# Patient Record
Sex: FEMALE | Race: WHITE | Employment: FULL TIME | URBAN - METROPOLITAN AREA
[De-identification: names, ages, dates, MRNs, and addresses within clinical notes are randomized per-mention and may not be internally consistent; named-entity substitution may affect disease eponyms.]

---

## 2017-02-24 ENCOUNTER — OFFICE VISIT (OUTPATIENT)
Dept: FAMILY MEDICINE CLINIC | Age: 57
End: 2017-02-24

## 2017-02-24 VITALS
DIASTOLIC BLOOD PRESSURE: 72 MMHG | RESPIRATION RATE: 20 BRPM | HEIGHT: 65 IN | BODY MASS INDEX: 22.82 KG/M2 | TEMPERATURE: 97.8 F | WEIGHT: 137 LBS | SYSTOLIC BLOOD PRESSURE: 118 MMHG | HEART RATE: 90 BPM

## 2017-02-24 DIAGNOSIS — Z12.11 SCREEN FOR COLON CANCER: ICD-10-CM

## 2017-02-24 DIAGNOSIS — F33.42 RECURRENT MAJOR DEPRESSIVE DISORDER, IN FULL REMISSION (HCC): ICD-10-CM

## 2017-02-24 DIAGNOSIS — F51.04 PSYCHOPHYSIOLOGICAL INSOMNIA: ICD-10-CM

## 2017-02-24 DIAGNOSIS — M79.7 FIBROMYALGIA: Primary | ICD-10-CM

## 2017-02-24 PROCEDURE — 99214 OFFICE O/P EST MOD 30 MIN: CPT | Performed by: FAMILY MEDICINE

## 2017-02-24 RX ORDER — NORTRIPTYLINE HYDROCHLORIDE 50 MG/1
50 CAPSULE ORAL NIGHTLY
Qty: 90 CAPSULE | Refills: 1 | Status: SHIPPED | OUTPATIENT
Start: 2017-02-24 | End: 2017-10-03 | Stop reason: SDUPTHER

## 2017-03-03 DIAGNOSIS — Z12.39 BREAST CANCER SCREENING: Primary | ICD-10-CM

## 2017-03-14 ENCOUNTER — OFFICE VISIT (OUTPATIENT)
Dept: INTERNAL MEDICINE CLINIC | Age: 57
End: 2017-03-14

## 2017-03-14 VITALS
HEIGHT: 65 IN | RESPIRATION RATE: 16 BRPM | DIASTOLIC BLOOD PRESSURE: 60 MMHG | OXYGEN SATURATION: 99 % | TEMPERATURE: 98 F | HEART RATE: 95 BPM | BODY MASS INDEX: 22.49 KG/M2 | SYSTOLIC BLOOD PRESSURE: 99 MMHG | WEIGHT: 135 LBS

## 2017-03-14 DIAGNOSIS — M94.0 COSTOCHONDRITIS, ACUTE: ICD-10-CM

## 2017-03-14 DIAGNOSIS — R05.9 COUGH: Primary | ICD-10-CM

## 2017-03-14 PROCEDURE — 99214 OFFICE O/P EST MOD 30 MIN: CPT | Performed by: NURSE PRACTITIONER

## 2017-03-14 RX ORDER — BENZONATATE 100 MG/1
100 CAPSULE ORAL 2 TIMES DAILY PRN
Qty: 20 CAPSULE | Refills: 0 | Status: SHIPPED | OUTPATIENT
Start: 2017-03-14 | End: 2017-03-21

## 2017-03-14 ASSESSMENT — ENCOUNTER SYMPTOMS
SHORTNESS OF BREATH: 0
COUGH: 1

## 2017-03-22 RX ORDER — NORTRIPTYLINE HYDROCHLORIDE 10 MG/1
CAPSULE ORAL
Qty: 60 CAPSULE | Refills: 2 | Status: SHIPPED | OUTPATIENT
Start: 2017-03-22 | End: 2017-10-03 | Stop reason: ALTCHOICE

## 2017-08-07 ENCOUNTER — OFFICE VISIT (OUTPATIENT)
Dept: FAMILY MEDICINE CLINIC | Age: 57
End: 2017-08-07

## 2017-08-07 VITALS
HEIGHT: 65 IN | DIASTOLIC BLOOD PRESSURE: 72 MMHG | WEIGHT: 129 LBS | BODY MASS INDEX: 21.49 KG/M2 | HEART RATE: 62 BPM | TEMPERATURE: 98.3 F | RESPIRATION RATE: 18 BRPM | SYSTOLIC BLOOD PRESSURE: 117 MMHG

## 2017-08-07 DIAGNOSIS — F17.200 TOBACCO DEPENDENCE: ICD-10-CM

## 2017-08-07 DIAGNOSIS — M77.12 LATERAL EPICONDYLITIS OF BOTH ELBOWS: Primary | ICD-10-CM

## 2017-08-07 DIAGNOSIS — M77.02 MEDIAL EPICONDYLITIS OF ELBOW, LEFT: ICD-10-CM

## 2017-08-07 DIAGNOSIS — Z12.11 SCREEN FOR COLON CANCER: ICD-10-CM

## 2017-08-07 DIAGNOSIS — M77.11 LATERAL EPICONDYLITIS OF BOTH ELBOWS: Primary | ICD-10-CM

## 2017-08-07 PROCEDURE — 99214 OFFICE O/P EST MOD 30 MIN: CPT | Performed by: FAMILY MEDICINE

## 2017-08-07 ASSESSMENT — PATIENT HEALTH QUESTIONNAIRE - PHQ9
1. LITTLE INTEREST OR PLEASURE IN DOING THINGS: 0
SUM OF ALL RESPONSES TO PHQ QUESTIONS 1-9: 0
SUM OF ALL RESPONSES TO PHQ9 QUESTIONS 1 & 2: 0
2. FEELING DOWN, DEPRESSED OR HOPELESS: 0

## 2017-08-16 ENCOUNTER — HOSPITAL ENCOUNTER (OUTPATIENT)
Dept: OTHER | Age: 57
Discharge: HOME | End: 2017-08-31
Attending: FAMILY MEDICINE | Admitting: FAMILY MEDICINE

## 2017-08-22 ENCOUNTER — HOSPITAL ENCOUNTER (OUTPATIENT)
Dept: PHYSICAL THERAPY | Age: 57
Discharge: HOME | End: 2017-08-23
Admitting: FAMILY MEDICINE

## 2017-08-24 ENCOUNTER — HOSPITAL ENCOUNTER (OUTPATIENT)
Dept: PHYSICAL THERAPY | Age: 57
Discharge: HOME | End: 2017-08-25
Admitting: FAMILY MEDICINE

## 2017-08-29 ENCOUNTER — HOSPITAL ENCOUNTER (OUTPATIENT)
Dept: PHYSICAL THERAPY | Age: 57
Discharge: HOME | End: 2017-08-30
Admitting: FAMILY MEDICINE

## 2017-08-31 DIAGNOSIS — M79.602 BILATERAL ARM PAIN: ICD-10-CM

## 2017-08-31 DIAGNOSIS — M79.601 BILATERAL ARM PAIN: ICD-10-CM

## 2017-08-31 DIAGNOSIS — R29.898 HAND WEAKNESS: Primary | ICD-10-CM

## 2017-09-05 ENCOUNTER — PROCEDURE VISIT (OUTPATIENT)
Dept: NEUROLOGY | Age: 57
End: 2017-09-05

## 2017-09-05 DIAGNOSIS — M79.602 BILATERAL ARM PAIN: Primary | ICD-10-CM

## 2017-09-05 DIAGNOSIS — M79.601 BILATERAL ARM PAIN: Primary | ICD-10-CM

## 2017-09-05 DIAGNOSIS — G56.22 ULNAR NEUROPATHY OF LEFT UPPER EXTREMITY: ICD-10-CM

## 2017-09-05 PROCEDURE — 95886 MUSC TEST DONE W/N TEST COMP: CPT | Performed by: PSYCHIATRY & NEUROLOGY

## 2017-09-05 PROCEDURE — 95911 NRV CNDJ TEST 9-10 STUDIES: CPT | Performed by: PSYCHIATRY & NEUROLOGY

## 2017-09-07 ENCOUNTER — TELEPHONE (OUTPATIENT)
Dept: FAMILY MEDICINE CLINIC | Age: 57
End: 2017-09-07

## 2017-09-07 DIAGNOSIS — M77.11 LATERAL EPICONDYLITIS OF BOTH ELBOWS: ICD-10-CM

## 2017-09-07 DIAGNOSIS — M77.02 MEDIAL EPICONDYLITIS OF ELBOW, LEFT: ICD-10-CM

## 2017-09-07 DIAGNOSIS — G56.20 ULNAR NEUROPATHY AT ELBOW, UNSPECIFIED LATERALITY: Primary | ICD-10-CM

## 2017-09-07 DIAGNOSIS — M77.12 LATERAL EPICONDYLITIS OF BOTH ELBOWS: ICD-10-CM

## 2017-09-07 PROBLEM — M77.00 MEDIAL EPICONDYLITIS OF ELBOW: Status: ACTIVE | Noted: 2017-09-07

## 2017-09-29 ENCOUNTER — OFFICE VISIT (OUTPATIENT)
Dept: ORTHOPEDIC SURGERY | Age: 57
End: 2017-09-29

## 2017-09-29 VITALS
BODY MASS INDEX: 22.16 KG/M2 | DIASTOLIC BLOOD PRESSURE: 79 MMHG | WEIGHT: 133 LBS | HEART RATE: 85 BPM | RESPIRATION RATE: 16 BRPM | SYSTOLIC BLOOD PRESSURE: 130 MMHG | HEIGHT: 65 IN

## 2017-09-29 DIAGNOSIS — G56.23 CUBITAL TUNNEL SYNDROME OF BOTH UPPER EXTREMITIES: Primary | ICD-10-CM

## 2017-09-29 PROCEDURE — 99243 OFF/OP CNSLTJ NEW/EST LOW 30: CPT | Performed by: ORTHOPAEDIC SURGERY

## 2017-10-03 ENCOUNTER — OFFICE VISIT (OUTPATIENT)
Dept: FAMILY MEDICINE CLINIC | Age: 57
End: 2017-10-03

## 2017-10-03 VITALS
OXYGEN SATURATION: 97 % | HEIGHT: 66 IN | SYSTOLIC BLOOD PRESSURE: 114 MMHG | BODY MASS INDEX: 21.21 KG/M2 | HEART RATE: 95 BPM | TEMPERATURE: 98.2 F | WEIGHT: 132 LBS | DIASTOLIC BLOOD PRESSURE: 64 MMHG

## 2017-10-03 DIAGNOSIS — Z01.818 PREOP EXAMINATION: Primary | ICD-10-CM

## 2017-10-03 DIAGNOSIS — G56.23 CUBITAL TUNNEL SYNDROME OF BOTH UPPER EXTREMITIES: ICD-10-CM

## 2017-10-03 PROCEDURE — 93000 ELECTROCARDIOGRAM COMPLETE: CPT | Performed by: NURSE PRACTITIONER

## 2017-10-03 PROCEDURE — 99243 OFF/OP CNSLTJ NEW/EST LOW 30: CPT | Performed by: NURSE PRACTITIONER

## 2017-10-03 RX ORDER — GABAPENTIN 400 MG/1
CAPSULE ORAL
Qty: 90 CAPSULE | Refills: 2 | Status: SHIPPED | OUTPATIENT
Start: 2017-10-03 | End: 2018-05-08 | Stop reason: SDUPTHER

## 2017-10-03 RX ORDER — NORTRIPTYLINE HYDROCHLORIDE 50 MG/1
50 CAPSULE ORAL NIGHTLY
Qty: 90 CAPSULE | Refills: 0 | Status: SHIPPED | OUTPATIENT
Start: 2017-10-03 | End: 2018-01-04 | Stop reason: SDUPTHER

## 2017-10-03 NOTE — MR AVS SNAPSHOT
Screen for colon cancer    Insomnia    Major depression ([facility] [address]    Vitamin D deficiency- 14 on 3/14/14    Hot flashes      Preventive Care        Date Due    Hepatitis C screening is recommended for all adults regardless of risk factors born between [facility] at least once (lifetime) who have never been tested. 1960    HIV screening is recommended for all people regardless of risk factors  aged 15-65 years at least once (lifetime) who have never been HIV tested. 12/28/1977    Tetanus Combination Vaccine (1 - Tdap) 12/28/1981    Pneumococcal Vaccine - Pneumovax for adults aged 19-64 years with: chronic heart disease, chronic lung disease, diabetes mellitus, alcoholism, chronic liver disease, or cigarette smoking. (1 of 1 - PPSV23) 12/28/1981    Cholesterol Screening 12/28/2002    Colonoscopy 12/28/2012    Mammograms are recommended every 2 years for low/average risk patients aged 48 - 69, and every year for high risk patients per updated national guidelines. However these guidelines can be individualized by your provider. 3/14/2016    Yearly Flu Vaccine (1) 9/1/2017            FREECULTR Signup           Our records indicate that you have an active FREECULTR account. You can view your After Visit Summary by going to [email or web address] and logging in with your FREECULTR username and password. If you don't have a FREECULTR username and password but a parent or guardian has access to your record, the parent or guardian should login with their own FREECULTR username and password and access your record to view the After Visit Summary. Additional Information  If you have questions, please contact the physician practice where you receive care. Remember, FREECULTR is NOT to be used for urgent needs. For medical emergencies, dial 911. For questions regarding your FREECULTR account call [phone]. If you have a clinical question, please call your doctor's office.

## 2017-10-03 NOTE — PROGRESS NOTES
Preoperative Consultation      Heena Verdugo  YOB: 1960    Date of Service:  10/3/2017    Vitals:    10/03/17 1122   BP: 114/64   Site: Right Arm   Position: Sitting   Cuff Size: Medium Adult   Pulse: 95   Temp: 98.2 °F (36.8 °C)   TempSrc: Oral   SpO2: 97%   Weight: 132 lb (59.9 kg)   Height: 5' 6\" (1.676 m)      Wt Readings from Last 2 Encounters:   10/03/17 132 lb (59.9 kg)   09/29/17 133 lb (60.3 kg)     BP Readings from Last 3 Encounters:   10/03/17 114/64   09/29/17 130/79   08/07/17 117/72        Chief Complaint   Patient presents with   Jonathan magallanes/ [facility] will be performing surgery due to pinch nerve on 10/12 and 11/2     Medication Refill     Gabapentin and Nortripyline     No Known Allergies  Outpatient Prescriptions Marked as Taking for the 10/3/17 encounter (Office Visit) with Vladislav Goodwin CNP   Medication Sig Dispense Refill    nortriptyline (PAMELOR) 50 MG capsule Take 1 capsule by mouth nightly 90 capsule 1    gabapentin (NEURONTIN) 400 MG capsule 1 cap q am and 2 caps nightly. 90 capsule 5       This patient presents to the office today for a preoperative consultation at the request of surgeon, Dr. Lola Alfaro, who plans on performing left ulnar nerve decompression at elbow on 10/12/17 and right ulnar nerve decompression at elbow on 11/2/17 at [facility].  The current problem began 6 months ago, and symptoms have been worsening with time. Patient reports that she is having shooting pains. She reports that she is having decreased coordination. Left forearm is more numbness. Right forearm is more painful. Patient had EMG that was completed on 9/5/17.      Planned anesthesia: General   Known anesthesia problems: None   Bleeding risk: No recent or remote history of abnormal bleeding  Personal or FH of DVT/PE: No    Patient objection to receiving blood products: No    Patient Active Problem List   Diagnosis    Hot flashes    Vitamin D deficiency- 15 on 3/14/14    Fibromyalgia    Screen for colon cancer    Insomnia    Major depression ([facility] [address]    Screening for breast cancer    Tobacco dependence    Medial epicondylitis of elbow    Ulnar neuropathy at elbow    Lateral epicondylitis of both elbows       Past Medical History:   Diagnosis Date    Abdominal pain, chronic, generalized     managed with OTC 'digestive enzymes'    Interstitial cystitis 2004    Dr Humera Simental    Vitamin D deficiency 3/19/2014     Past Surgical History:   Procedure Laterality Date    BREAST ENHANCEMENT SURGERY  2008    BREAST LUMPECTOMY  1980's    left breast; benign cyst    CARPAL TUNNEL RELEASE      bilateral; in 1980's    HYSTERECTOMY      for fibroids; ovaries remain   Donel Preethi Simental; for incontinence     Family History   Problem Relation Age of Onset    Seizures Mother     Kidney Disease Father      due to BPH     Social History     Social History    Marital status:      Spouse name: Michela Guzmán Number of children: 3    Years of education: N/A     Occupational History          Social History Main Topics    Smoking status: Current Every Day Smoker     Packs/day: 1.00     Years: 30.00     Types: Cigarettes    Smokeless tobacco: Never Used      Comment: encouraged to consider quitting. not ready yet, but starting to use vapor nicotine to decrease use (8/16)    Alcohol use 0.0 oz/week     0 Standard drinks or equivalent per week      Comment: rare use    Drug use: No    Sexual activity: Yes     Partners: Male      Comment:  Mia Rice     Other Topics Concern    Not on file     Social History Narrative       Review of Systems  All other systems reviewed and were negative except for what was noted in the HPI. Physical Exam   Constitutional: She is oriented to person, place, and time. She appears well-developed and well-nourished. No distress. HENT:   Head: Normocephalic and atraumatic.    Mouth/Throat: Uvula is midline, oropharynx is clear and moist and mucous membranes are normal.   Eyes: Conjunctivae and EOM are normal. Pupils are equal, round, and reactive to light. Neck: Trachea normal and normal range of motion. Neck supple. No JVD present. Carotid bruit is not present. Cardiovascular: Normal rate, regular rhythm, normal heart sounds and intact distal pulses. Exam reveals no gallop and no friction rub. No murmur heard. Pulmonary/Chest: Effort normal and breath sounds normal. No respiratory distress. She has no wheezes. She has no rales. Musculoskeletal: She exhibits no edema. Full alexis elbow ROM. Positive for medial forearm tenderness to palpation. Neurological: She is alert and oriented to person, place, and time. She has normal strength. No cranial nerve deficit. Coordination and gait normal.   Skin: Skin is warm and dry. No rash noted. No erythema. Psychiatric: She has a normal mood and affect. Her behavior is normal.     EKG Interpretation:  normal EKG, normal sinus rhythm, there are no previous tracings available for comparison. Lab Review   Lab Results   Component Value Date     03/14/2014    K 4.1 03/14/2014     03/14/2014    CO2 27 03/14/2014    BUN 13 03/14/2014    CREATININE 0.7 03/14/2014    GLUCOSE 88 03/14/2014    CALCIUM 9.6 03/14/2014     Lab Results   Component Value Date    WBC 4.9 10/30/2012    HGB 13.6 10/30/2012    HCT 40.1 10/30/2012    MCV 92.8 10/30/2012     10/30/2012           Assessment:       49 y.o. patient with planned surgery as above. Known risk factors for perioperative complications: Tobacco abuse  Current medications which may produce withdrawal symptoms if withheld perioperatively: none      Plan:     1. Preoperative workup as follows: ECG  2. Change in medication regimen before surgery: Hold all medications on morning of surgery  3.  Prophylaxis for cardiac events with perioperative beta-blockers: Not indicated  ACC/AHA indications for pre-operative beta-blocker use:    · Vascular surgery with history of postitive stress test  · Intermediate or high risk surgery with history of CAD   · Intermediate or high risk surgery with multiple clinical predictors of CAD- 2 of the following: history of compensated or prior heart failure, history of cerebrovascular disease, DM, or renal insufficiency    Routine administration of higher-dose, long-acting metoprolol in beta-blockernaïve patients on the day of surgery, and in the absence of dose titration is associated with an overall increase in mortality. Beta-blockers should be started days to weeks prior to surgery and titrated to pulse < 70.  4. Deep vein thrombosis prophylaxis: regimen to be chosen by surgical team  5. No contraindications to planned surgery of left ulnar nerve decompression at elbow on 10/12/17 and right ulnar nerve decompression at elbow on 11/2/17 with Dr. Sabrina Marks   6. Tobacco use- Smoking cessation discussed and needed. Patient declined at this time. Patient declines flu vaccine.

## 2017-10-06 NOTE — TELEPHONE ENCOUNTER
Called and spoke to Dr. Madhav Flowers' [address] and she reports that patient can stay on medication. Discussed that I do not see a reason for her to stop prior to date of surgery. Please call patient and let her know that she can continue her medication.

## 2017-10-09 ENCOUNTER — PAT TELEPHONE (OUTPATIENT)
Dept: PREADMISSION TESTING | Age: 57
End: 2017-10-09

## 2017-10-09 VITALS — WEIGHT: 132 LBS | HEIGHT: 65 IN | BODY MASS INDEX: 21.99 KG/M2

## 2017-10-09 NOTE — PRE-PROCEDURE INSTRUCTIONS
C-Difficile admission screening and protocol:     * Admitted with diarrhea?no     *Prior history of C-Diff. In last 3 months?yes-over1 year ago-     *Antibiotic use in the past 6-8 weeks?no     *Prior hospitalization or nursing home in the last month?     no

## 2017-10-09 NOTE — PRE-PROCEDURE INSTRUCTIONS
[address] [facility] time__0915__________        Surgery time____1035________    Take the following medications with a sip of water: no meds    Do not eat or drink anything after 12:00 midnight prior to your surgery. This includes water chewing gum, mints and ice chips. You may brush your teeth and gargle the morning of your surgery, but do not swallow the water     Please see your family doctor/pediatrician for a history and physical and/or concerning medications. Bring any test results/reports from your physicians office. If you are under the care of a heart doctor or specialist doctor, please be aware that you may be asked to them for clearance    You may be asked to stop blood thinners such as Coumadin, Plavix, Fragmin, Lovenox, etc., or any anti-inflammatories such as:  Aspirin, Ibuprofen, Advil, Naproxen prior to your surgery. We also ask that you stop any OTC medications such as fish oil, vitamin E, glucosamine, garlic, Multivitamins, COQ 10, etc.may take tylenol    We ask that you do not smoke 24 hours prior to surgery  We ask that you do not  drink any alcoholic beverages 24 hours prior to surgery     You must make arrangements for a responsible adult to take you home after your surgery. For your safety you will not be allowed to leave alone or drive yourself home. Your surgery will be cancelled if you do not have a ride home. Also for your safety, it is strongly suggested that someone stay with you the first 24 hours after your surgery. A parent or legal guardian must accompany a child scheduled for surgery and plan to stay at the hospital until the child is discharged. Please do not bring other children with you. For your comfort, please wear simple loose fitting clothing to the hospital.  Please do not bring valuables.     Do not wear any make-up or nail polish on your fingers or toes      For your safety, please do not wear any jewelry or body piercing's on the day of surgery. All jewelry must be removed. If you have dentures, they will be removed before going to operating room. For your convenience, we will provide you with a container. If you wear contact lenses or glasses, they will be removed, please bring a case for them. If you have a living will and a durable power of  for healthcare, please bring in a copy. As part of our patient safety program to minimize surgical site infections, we ask you to do the following:    · Please notify your surgeon if you develop any illness between         now and the  day of your surgery. · This includes a cough, cold, fever, sore throat, nausea,         or vomiting, and diarrhea, etc.  ·  Please notify your surgeon if you experience dizziness, shortness         of breath or blurred vision between now and the time of your surgery. Do not shave your operative site 96 hours prior to surgery. For face and neck surgery, men may use an electric razor 48 hours   prior to surgery. You may shower the night before surgery or the morning of   your surgery with an antibacterial soap. You will need to bring a photo ID and insurance card    [facility] has an onsite pharmacy, would you like to utilize our pharmacy     If you will be staying overnight and use a C-pap machine, please bring   your C-pap to hospital     Our goal is to provide you with excellent care, therefore, visitors will be limited to two(2) in the room at a time so that we may focus on providing this care for you. Please contact pre-admission testing if you have any further questions. [facility] phone number:  [phone] [facility] fax number:  [phone]  Please note these are generalized instructions for all surgical cases, you may be provided with more specific instructions according to your surgery.

## 2017-10-12 ENCOUNTER — TELEPHONE (OUTPATIENT)
Dept: OTHER | Facility: CLINIC | Age: 57
End: 2017-10-12

## 2017-10-12 ENCOUNTER — TELEPHONE (OUTPATIENT)
Dept: ORTHOPEDIC SURGERY | Age: 57
End: 2017-10-12

## 2017-10-12 ENCOUNTER — HOSPITAL ENCOUNTER (OUTPATIENT)
Dept: SURGERY | Age: 57
Discharge: HOME | End: 2017-10-12
Attending: ORTHOPAEDIC SURGERY | Admitting: ORTHOPAEDIC SURGERY

## 2017-10-12 VITALS
WEIGHT: 131.17 LBS | SYSTOLIC BLOOD PRESSURE: 134 MMHG | DIASTOLIC BLOOD PRESSURE: 71 MMHG | TEMPERATURE: 97.8 F | HEART RATE: 74 BPM | BODY MASS INDEX: 21.85 KG/M2 | HEIGHT: 65 IN | OXYGEN SATURATION: 99 % | RESPIRATION RATE: 18 BRPM

## 2017-10-12 RX ORDER — HYDROCODONE BITARTRATE AND ACETAMINOPHEN 5; 325 MG/1; MG/1
1 TABLET ORAL EVERY 6 HOURS PRN
Status: DISCONTINUED | OUTPATIENT
Start: 2017-10-12 | End: 2017-10-13 | Stop reason: HOSPADM

## 2017-10-12 RX ORDER — SODIUM CHLORIDE 9 MG/ML
INJECTION, SOLUTION INTRAVENOUS CONTINUOUS
Status: DISCONTINUED | OUTPATIENT
Start: 2017-10-12 | End: 2017-10-13 | Stop reason: HOSPADM

## 2017-10-12 RX ORDER — LABETALOL HYDROCHLORIDE 5 MG/ML
5 INJECTION, SOLUTION INTRAVENOUS EVERY 10 MIN PRN
Status: DISCONTINUED | OUTPATIENT
Start: 2017-10-12 | End: 2017-10-13 | Stop reason: HOSPADM

## 2017-10-12 RX ORDER — PROMETHAZINE HYDROCHLORIDE 25 MG/ML
6.25 INJECTION, SOLUTION INTRAMUSCULAR; INTRAVENOUS
Status: ACTIVE | OUTPATIENT
Start: 2017-10-12 | End: 2017-10-12

## 2017-10-12 RX ORDER — FENTANYL CITRATE 50 UG/ML
25 INJECTION, SOLUTION INTRAMUSCULAR; INTRAVENOUS EVERY 5 MIN PRN
Status: DISCONTINUED | OUTPATIENT
Start: 2017-10-12 | End: 2017-10-13 | Stop reason: HOSPADM

## 2017-10-12 RX ADMIN — SODIUM CHLORIDE: 9 INJECTION, SOLUTION INTRAVENOUS at 10:30

## 2017-10-12 RX ADMIN — HYDROCODONE BITARTRATE AND ACETAMINOPHEN 1 TABLET: 5; 325 TABLET ORAL at 12:07

## 2017-10-12 ASSESSMENT — PAIN SCALES - GENERAL
PAINLEVEL_OUTOF10: 1
PAINLEVEL_OUTOF10: 0

## 2017-10-12 ASSESSMENT — PAIN - FUNCTIONAL ASSESSMENT: PAIN_FUNCTIONAL_ASSESSMENT: 0-10

## 2017-10-12 ASSESSMENT — PAIN DESCRIPTION - DESCRIPTORS: DESCRIPTORS: ACHING;DISCOMFORT;SORE

## 2017-10-12 NOTE — OP NOTE
OPERATIVE REPORT              . Patient:  Annalise Roberts    YOB: 1960  Date of Service:  10/12/2017   Location:  [facility]      Preoperative Diagnosis:   Left Ulnar Nerve entrapment at the Cubital Tunnel    Postoperative Diagnosis:  Same    Procedure:   Left Ulnar Nerve decompression & Cubital Tunnel Release    Surgeon:  Wayland Oppenheim. Gabriel Sotomayor MD    Anesthesia:  General          Blood Loss:  Minimal    Complications:  None       Tourniquet Time: 4 minutes. Indications:  Ms. Annalise Roberts  is a 49y.o. year old female with entrapment of her Left ulnar nerve at the elbow. I have discussed preoperatively with Ms. Annalise Roberts  the complications, limitations, expectations, alternatives and risks of surgical care, which she understood. Ms. Annalise Roberts  has provided written informed consent to proceed. After written consent was obtained and the proper operative site identified and marked, Ms. Annalise Roberts was brought to the operating room and placed in the supine position on the operating room table. The Left arm was extended on a hand table & the Left upper extremity was prepped and draped in the usual sterile fashion. After Esmarch exsanguination the pneumo-tourniquet was inflated about the upper arm to 250 millimeters of mercury. A curvilinear incision was fashioned over the posterior medial aspect of the elbow centered between the medial epicondyle and the tip of the olecranon. Dissection was carried carefully through the subcutaneous tissue identifying and protecting the superficial neurovascular structures. The cubital tunnel was identified and cleared of overlying soft tissue. Careful incision allowed exposure of the ulnar nerve. The nerve was traced proximally and circumferential control of the nerve was obtained.  It was dissected proximally to the level of the connection between the biceps and triceps muscles, approximately 3 cm proximal to the medial epicondyle. It was carefully mobilized from the medial intermuscular septum. It was traced distally, being completely freed along the course of the cubital tunnel, and was dissected distally to the level of the second motor branch as it split the heads of the FCU muscle. There was a tight fibrous band at the confluence of the heads of the FCU which was carefully divided. Digital palpation revealed that there were no further proximal or distal constriction about the nerve. The Ulnar Nerve was found to be stable in it's groove without tendency toward subluxation or snapping. The wound at this point was irrigated copiously with sterile saline for irrigation and the pneumo-tourniquet deflated after a period of 4  minutes of elevation. The fingers were immediately pink and well perfused. Hemostasis was easily obtained with direct pressure and bipolar cautery. The subcutaneous tissue was closed with interrupted absorbable sutures. The skin was closed with interrupted absorbable sutures and local ansethetic was instilled for postoperative analgesia. The wound was dressed with Adaptic dry sterile dressings and a bulky long arm Morley type dressing was applied. The patient was awakened from anesthesia, having tolerated the procedure without apparent complication, and was returned to the recovery room in stable condition. At the conclusion of the procedure all needle, instrument and sponge counts were correct. Nick Lowry MD   10/12/2017 , 10:51 AM

## 2017-10-12 NOTE — ANESTHESIA PRE-OP
nortriptyline (PAMELOR) 50 MG capsule Take 1 capsule by mouth nightly 90 capsule 0     No current facility-administered medications on file prior to encounter. Current Outpatient Prescriptions   Medication Sig Dispense Refill    gabapentin (NEURONTIN) 400 MG capsule 1 cap q am and 2 caps nightly. 90 capsule 2    nortriptyline (PAMELOR) 50 MG capsule Take 1 capsule by mouth nightly 90 capsule 0     No current facility-administered medications for this encounter. Vital Signs (Current) There were no vitals filed for this visit. Vital Signs Statistics (for past 48 hrs)     No Data Recorded    BP Readings from Last 3 Encounters:   10/03/17 114/64   09/29/17 130/79   08/07/17 117/72     BMI  There is no height or weight on file to calculate BMI. Estimated body mass index is 21.97 kg/m² as calculated from the following:    Height as of 10/9/17: 5' 5\" (1.651 m). Weight as of 10/9/17: 132 lb (59.9 kg).     CBC   Lab Results   Component Value Date    WBC 4.9 10/30/2012    RBC 4.32 10/30/2012    HGB 13.6 10/30/2012    HCT 40.1 10/30/2012    MCV 92.8 10/30/2012    RDW 14.3 10/30/2012     10/30/2012     CMP    Lab Results   Component Value Date     03/14/2014    K 4.1 03/14/2014     03/14/2014    CO2 27 03/14/2014    BUN 13 03/14/2014    CREATININE 0.7 03/14/2014    GFRAA >60 03/14/2014    GFRAA >60 10/30/2012    AGRATIO 1.9 03/14/2014    LABGLOM >60 03/14/2014    GLUCOSE 88 03/14/2014    PROT 6.6 03/14/2014    PROT 6.7 10/30/2012    CALCIUM 9.6 03/14/2014    BILITOT 0.2 03/14/2014    ALKPHOS 42 03/14/2014    AST 16 03/14/2014    ALT 19 03/14/2014     BMP    Lab Results   Component Value Date     03/14/2014    K 4.1 03/14/2014     03/14/2014    CO2 27 03/14/2014    BUN 13 03/14/2014    CREATININE 0.7 03/14/2014    CALCIUM 9.6 03/14/2014    GFRAA >60 03/14/2014    GFRAA >60 10/30/2012    LABGLOM >60 03/14/2014    GLUCOSE 88 03/14/2014     POCGlucose  No results for input(s): GLUCOSE in the last 72 hours. Coags  No results found for: PROTIME, INR, APTT  HCG (If Applicable) No results found for: PREGTESTUR, PREGSERUM, HCG, HCGQUANT   ABGs No results found for: PHART, PO2ART, XSN9SIJ, DVJ2MWX, BEART, I9LZFWEZ   Type & Screen (If Applicable)  No results found for: LABABO, LABRH        NPO Status:  8 hours                                                                               BMI:   Wt Readings from Last 3 Encounters:   10/09/17 132 lb (59.9 kg)   10/03/17 132 lb (59.9 kg)   09/29/17 133 lb (60.3 kg)     There is no height or weight on file to calculate BMI. Anesthesia Evaluation  Patient summary reviewed no history of anesthetic complications:   Airway: Mallampati: III  TM distance: >3 FB   Neck ROM: full   Dental:    (+) upper dentures      Pulmonary:negative ROS and normal exam                               Cardiovascular:negative ROS  Exercise tolerance: good (>4 METS),       (-) murmur and carotid bruit      Rhythm: regular  Rate: normal           Beta Blocker:  Not on Beta Blocker         Neuro/Psych:   (+) neuromuscular disease:,    (-) psychiatric history           GI/Hepatic/Renal: neg ROS            Endo/Other: negative ROS       (-) no Type II DM               Abdominal:           Vascular:                                    Anesthesia Plan      MAC     ASA 2       Induction: intravenous. MIPS: Prophylactic antiemetics administered. Anesthetic plan and risks discussed with patient and child/children. Plan discussed with CRNA. DOS STAFF ADDENDUM:    Pt seen and examined, chart reviewed (including anesthesia, drug and allergy history). No interval changes to history and physical examination. Anesthetic plan, risks, benefits, alternatives, and personnel involved discussed with patient. Patient verbalized an understanding and agrees to proceed.       Roscoe Carter MD  October 12, 2017  9:37 AM      Roscoe Carter MD   10/12/2017

## 2017-10-12 NOTE — H&P
Pre-operative Update of H&P:    I  have seen & examined Ms. Chiquis Fink related solely to her hand and upper extremity conditions, prior to the scheduled procedure on the date of her surgery. The indications for the planned surgical procedure & and her upper-extremity conditionare unchanged. Please see the Anesthesia Pre-Op Note from date of surgery for Ms. Ava Norris's systemic evaluation.

## 2017-10-13 RX ORDER — HYDROCODONE BITARTRATE AND ACETAMINOPHEN 5; 325 MG/1; MG/1
1 TABLET ORAL EVERY 6 HOURS
Qty: 20 TABLET | Refills: 0 | Status: SHIPPED | OUTPATIENT
Start: 2017-10-13 | End: 2017-11-02 | Stop reason: HOSPADM

## 2017-10-18 ENCOUNTER — TELEPHONE (OUTPATIENT)
Dept: ORTHOPEDIC SURGERY | Age: 57
End: 2017-10-18

## 2017-10-20 ENCOUNTER — OFFICE VISIT (OUTPATIENT)
Dept: ORTHOPEDIC SURGERY | Age: 57
End: 2017-10-20

## 2017-10-20 VITALS — RESPIRATION RATE: 16 BRPM | WEIGHT: 133 LBS | HEIGHT: 65 IN | BODY MASS INDEX: 22.16 KG/M2

## 2017-10-20 DIAGNOSIS — G56.20 ULNAR NEUROPATHY AT ELBOW, UNSPECIFIED LATERALITY: Primary | ICD-10-CM

## 2017-10-20 PROCEDURE — 99024 POSTOP FOLLOW-UP VISIT: CPT | Performed by: PHYSICIAN ASSISTANT

## 2017-10-20 NOTE — PATIENT INSTRUCTIONS
Postoperative Instructions After Ulnar Nerve Decompression    Dr. Grabiel Murray. A[place]        1. After bandages are removed one week from surgery, you may chose to wear a small bandage over the incision if you wish, though you do not need to. 2. Keep incision dry for a total of 12 days from the day of surgery. Thereafter, you may wash with mild soap and water and shower normally. 3. Once your stiches have fully disappeared, you should begin gently massaging the incision with Vitamin E (may use Vitamin E lotion or contents of Vitamin E capsule). 4. Work hard on motion of the fingers and wrist & elbow, straightening each finger fully and bending each finger fully, bending wrist forward and bending wrist backwards, fully straightening elbow and fully bending elbow. Do not be concerned if you experience discomfort. This will not damage the surgery. 5. You may begin using the hand & arm as it feels comfortable beginning 12-14 days from the day of surgery. You may not feel entirely comfortable gripping or lifting heavy objects for several weeks. 6. The stitches are dissolvable and may take up to three weeks from day of surgery to completely disappear. 7. You may expect to see some skin peel off around the incision. You may be left with a small area of pink baby skin. This is quite normal.    Thank you for choosing [facility]) Physicians for your Hand and Upper Extremity needs. If we can be of any further assistance to you, please do not hesitate to contact us.     Office Phone Number:  [phone]  or  [phone]

## 2017-10-31 ENCOUNTER — PAT TELEPHONE (OUTPATIENT)
Dept: PREADMISSION TESTING | Age: 57
End: 2017-10-31

## 2017-10-31 NOTE — PRE-PROCEDURE INSTRUCTIONS
[address] [facility] time____________        Surgery time____________    Take the following medications with a sip of water:    Do not eat or drink anything after 12:00 midnight prior to your surgery. This includes water chewing gum, mints and ice chips. You may brush your teeth and gargle the morning of your surgery, but do not swallow the water     Please see your family doctor/pediatrician for a history and physical and/or concerning medications. Bring any test results/reports from your physicians office. If you are under the care of a heart doctor or specialist doctor, please be aware that you may be asked to them for clearance    You may be asked to stop blood thinners such as Coumadin, Plavix, Fragmin, Lovenox, etc., or any anti-inflammatories such as:  Aspirin, Ibuprofen, Advil, Naproxen prior to your surgery. We also ask that you stop any OTC medications such as fish oil, vitamin E, glucosamine, garlic, Multivitamins, COQ 10, etc.    We ask that you do not smoke 24 hours prior to surgery  We ask that you do not  drink any alcoholic beverages 24 hours prior to surgery     You must make arrangements for a responsible adult to take you home after your surgery. For your safety you will not be allowed to leave alone or drive yourself home. Your surgery will be cancelled if you do not have a ride home. Also for your safety, it is strongly suggested that someone stay with you the first 24 hours after your surgery. A parent or legal guardian must accompany a child scheduled for surgery and plan to stay at the hospital until the child is discharged. Please do not bring other children with you. For your comfort, please wear simple loose fitting clothing to the hospital.  Please do not bring valuables.     Do not wear any make-up or nail polish on your fingers or toes      For your safety, please do not wear any jewelry or body piercing's on the day of surgery. All jewelry must be removed. If you have dentures, they will be removed before going to operating room. For your convenience, we will provide you with a container. If you wear contact lenses or glasses, they will be removed, please bring a case for them. If you have a living will and a durable power of  for healthcare, please bring in a copy. As part of our patient safety program to minimize surgical site infections, we ask you to do the following:    · Please notify your surgeon if you develop any illness between         now and the  day of your surgery. · This includes a cough, cold, fever, sore throat, nausea,         or vomiting, and diarrhea, etc.  ·  Please notify your surgeon if you experience dizziness, shortness         of breath or blurred vision between now and the time of your surgery. Do not shave your operative site 96 hours prior to surgery. For face and neck surgery, men may use an electric razor 48 hours   prior to surgery. You may shower the night before surgery or the morning of   your surgery with an antibacterial soap. You will need to bring a photo ID and insurance card    [facility] has an onsite pharmacy, would you like to utilize our pharmacy     If you will be staying overnight and use a C-pap machine, please bring   your C-pap to hospital     Our goal is to provide you with excellent care, therefore, visitors will be limited to two(2) in the room at a time so that we may focus on providing this care for you. Please contact pre-admission testing if you have any further questions. [facility] phone number:  [phone] [facility] fax number:  [phone]  Please note these are generalized instructions for all surgical cases, you may be provided with more specific instructions according to your surgery.

## 2017-11-01 RX ORDER — FENTANYL CITRATE 50 UG/ML
25 INJECTION, SOLUTION INTRAMUSCULAR; INTRAVENOUS EVERY 5 MIN PRN
Status: DISCONTINUED | OUTPATIENT
Start: 2017-11-01 | End: 2017-11-02

## 2017-11-01 RX ORDER — OXYCODONE HYDROCHLORIDE AND ACETAMINOPHEN 5; 325 MG/1; MG/1
2 TABLET ORAL PRN
Status: ACTIVE | OUTPATIENT
Start: 2017-11-01 | End: 2017-11-01

## 2017-11-01 RX ORDER — ONDANSETRON 2 MG/ML
4 INJECTION INTRAMUSCULAR; INTRAVENOUS
Status: ACTIVE | OUTPATIENT
Start: 2017-11-01 | End: 2017-11-01

## 2017-11-01 RX ORDER — OXYCODONE HYDROCHLORIDE AND ACETAMINOPHEN 5; 325 MG/1; MG/1
1 TABLET ORAL PRN
Status: ACTIVE | OUTPATIENT
Start: 2017-11-01 | End: 2017-11-01

## 2017-11-02 ENCOUNTER — HOSPITAL ENCOUNTER (OUTPATIENT)
Dept: SURGERY | Age: 57
Discharge: HOME | End: 2017-11-02
Admitting: ORTHOPAEDIC SURGERY

## 2017-11-02 VITALS
HEIGHT: 65 IN | OXYGEN SATURATION: 98 % | TEMPERATURE: 97.7 F | HEART RATE: 86 BPM | DIASTOLIC BLOOD PRESSURE: 65 MMHG | SYSTOLIC BLOOD PRESSURE: 125 MMHG | RESPIRATION RATE: 18 BRPM | WEIGHT: 128.97 LBS | BODY MASS INDEX: 21.49 KG/M2

## 2017-11-02 RX ORDER — MORPHINE SULFATE 2 MG/ML
1 INJECTION, SOLUTION INTRAMUSCULAR; INTRAVENOUS EVERY 5 MIN PRN
Status: DISCONTINUED | OUTPATIENT
Start: 2017-11-02 | End: 2017-11-03 | Stop reason: HOSPADM

## 2017-11-02 RX ORDER — SODIUM CHLORIDE 0.9 % (FLUSH) 0.9 %
10 SYRINGE (ML) INJECTION EVERY 12 HOURS SCHEDULED
Status: DISCONTINUED | OUTPATIENT
Start: 2017-11-02 | End: 2017-11-03 | Stop reason: HOSPADM

## 2017-11-02 RX ORDER — ONDANSETRON 2 MG/ML
4 INJECTION INTRAMUSCULAR; INTRAVENOUS
Status: ACTIVE | OUTPATIENT
Start: 2017-11-02 | End: 2017-11-02

## 2017-11-02 RX ORDER — OXYCODONE HYDROCHLORIDE AND ACETAMINOPHEN 5; 325 MG/1; MG/1
2 TABLET ORAL PRN
Status: COMPLETED | OUTPATIENT
Start: 2017-11-02 | End: 2017-11-02

## 2017-11-02 RX ORDER — OXYCODONE HYDROCHLORIDE AND ACETAMINOPHEN 5; 325 MG/1; MG/1
1 TABLET ORAL PRN
Status: COMPLETED | OUTPATIENT
Start: 2017-11-02 | End: 2017-11-02

## 2017-11-02 RX ORDER — SODIUM CHLORIDE 9 MG/ML
INJECTION, SOLUTION INTRAVENOUS CONTINUOUS
Status: DISCONTINUED | OUTPATIENT
Start: 2017-11-02 | End: 2017-11-03 | Stop reason: HOSPADM

## 2017-11-02 RX ORDER — MORPHINE SULFATE 2 MG/ML
2 INJECTION, SOLUTION INTRAMUSCULAR; INTRAVENOUS EVERY 5 MIN PRN
Status: DISCONTINUED | OUTPATIENT
Start: 2017-11-02 | End: 2017-11-03 | Stop reason: HOSPADM

## 2017-11-02 RX ORDER — MEPERIDINE HYDROCHLORIDE 25 MG/ML
12.5 INJECTION INTRAMUSCULAR; INTRAVENOUS; SUBCUTANEOUS EVERY 5 MIN PRN
Status: DISCONTINUED | OUTPATIENT
Start: 2017-11-02 | End: 2017-11-03 | Stop reason: HOSPADM

## 2017-11-02 RX ORDER — SODIUM CHLORIDE 0.9 % (FLUSH) 0.9 %
10 SYRINGE (ML) INJECTION PRN
Status: DISCONTINUED | OUTPATIENT
Start: 2017-11-02 | End: 2017-11-03 | Stop reason: HOSPADM

## 2017-11-02 RX ORDER — HYDROCODONE BITARTRATE AND ACETAMINOPHEN 5; 325 MG/1; MG/1
1 TABLET ORAL EVERY 6 HOURS PRN
Qty: 20 TABLET | Refills: 0 | Status: SHIPPED | OUTPATIENT
Start: 2017-11-02 | End: 2017-11-09

## 2017-11-02 RX ORDER — FENTANYL CITRATE 50 UG/ML
50 INJECTION, SOLUTION INTRAMUSCULAR; INTRAVENOUS EVERY 5 MIN PRN
Status: DISCONTINUED | OUTPATIENT
Start: 2017-11-02 | End: 2017-11-03 | Stop reason: HOSPADM

## 2017-11-02 RX ORDER — FENTANYL CITRATE 50 UG/ML
25 INJECTION, SOLUTION INTRAMUSCULAR; INTRAVENOUS EVERY 5 MIN PRN
Status: DISCONTINUED | OUTPATIENT
Start: 2017-11-02 | End: 2017-11-03 | Stop reason: HOSPADM

## 2017-11-02 RX ADMIN — OXYCODONE HYDROCHLORIDE AND ACETAMINOPHEN 1 TABLET: 5; 325 TABLET ORAL at 11:20

## 2017-11-02 RX ADMIN — SODIUM CHLORIDE: 9 INJECTION, SOLUTION INTRAVENOUS at 09:26

## 2017-11-02 ASSESSMENT — PAIN SCALES - GENERAL
PAINLEVEL_OUTOF10: 4
PAINLEVEL_OUTOF10: 2
PAINLEVEL_OUTOF10: 3

## 2017-11-02 ASSESSMENT — PAIN DESCRIPTION - PAIN TYPE: TYPE: SURGICAL PAIN

## 2017-11-02 ASSESSMENT — PAIN DESCRIPTION - DESCRIPTORS: DESCRIPTORS: ACHING;DISCOMFORT;SORE

## 2017-11-02 ASSESSMENT — PAIN - FUNCTIONAL ASSESSMENT: PAIN_FUNCTIONAL_ASSESSMENT: 0-10

## 2017-11-02 NOTE — OP NOTE
OPERATIVE REPORT              . Patient:  Isma Kenney    YOB: 1960  Date of Service:  11/2/2017   Location:  [facility]      Preoperative Diagnosis:   Right Ulnar Nerve entrapment at the Cubital Tunnel    Postoperative Diagnosis:  Same    Procedure:   Right Ulnar Nerve decompression & Cubital Tunnel Release    Surgeon:  Miguel Mason. Norman Mora MD    Anesthesia:  General          Blood Loss:  Minimal    Complications:  None       Tourniquet Time: 5 minutes. Indications:  Ms. Isma Kenney  is a 49y.o. year old female with entrapment of her Right ulnar nerve at the elbow. I have discussed preoperatively with Ms. Isma Kenney  the complications, limitations, expectations, alternatives and risks of surgical care, which she understood. Ms. Isma Kenney  has provided written informed consent to proceed. After written consent was obtained and the proper operative site identified and marked, Ms. Isma Kenney was brought to the operating room and placed in the supine position on the operating room table. The Right arm was extended on a hand table & the Right upper extremity was prepped and draped in the usual sterile fashion. After Esmarch exsanguination the pneumo-tourniquet was inflated about the upper arm to 250 millimeters of mercury. A curvilinear incision was fashioned over the posterior medial aspect of the elbow centered between the medial epicondyle and the tip of the olecranon. Dissection was carried carefully through the subcutaneous tissue identifying and protecting the superficial neurovascular structures. The cubital tunnel was identified and cleared of overlying soft tissue. Careful incision allowed exposure of the ulnar nerve. The nerve was traced proximally and circumferential control of the nerve was obtained.  It was dissected proximally to the level of the connection between the biceps and triceps muscles, approximately 3 cm proximal to the medial epicondyle. It was carefully mobilized from the medial intermuscular septum. It was traced distally, being completely freed along the course of the cubital tunnel, and was dissected distally to the level of the second motor branch as it split the heads of the FCU muscle. There was a tight fibrous band at the confluence of the heads of the FCU which was carefully divided. Digital palpation revealed that there were no further proximal or distal constriction about the nerve. The Ulnar Nerve was found to be stable in it's groove without tendency toward subluxation or snapping. The wound at this point was irrigated copiously with sterile saline for irrigation and the pneumo-tourniquet deflated after a period of 5  minutes of elevation. The fingers were immediately pink and well perfused. Hemostasis was easily obtained with direct pressure and bipolar cautery. The subcutaneous tissue was closed with interrupted absorbable sutures. The skin was closed with interrupted absorbable sutures and local ansethetic was instilled for postoperative analgesia. The wound was dressed with Adaptic dry sterile dressings and a bulky long arm Morley type dressing was applied. The patient was awakened from anesthesia, having tolerated the procedure without apparent complication, and was returned to the recovery room in stable condition. At the conclusion of the procedure all needle, instrument and sponge counts were correct. Tamra Flowers MD   11/2/2017 , 9:59 AM

## 2017-11-02 NOTE — PROGRESS NOTES
1.  Patient is identified using name and date of birth. 2.  The patient is free from signs and symptoms of injury. 3.  The patient receives appropriate medication(s), safely administered during the perioperative period. 4.  The patient had wound/tissuue perfusion consistent with or improved from baseline levels established preoperatively. 5.  The patient is at or returning to normothermia at the conclusion of the immediate postoperative period. 6.  The patient's fluid, electrolyte, and acid base balances are consistent with or improved from baseline levels established preoperatively. 7.  The patient's pulmonary function is consistent with or improved from baseline levels established preoperatively. 8.  The patient's cardiovascular status is consistent with or improved from baseline levels established preoperatively. 9.  The patient/caregiver participates in decisions affecting his or her perioperative care. 10. The patient's care is consistent with the individualized perioperative plan of care. 11. The patient's right to privacy is maintained. 12. The patient is the recipient of competent and ethical care within legal standards of practice. 13.  The patient's value system, lifestyle, ethnicity, and culture are considered, respected, and incorporated in the perioperative plan of care. 14.  The patient demonstrates and/or reports adequate pain control throughout the perioperative period. 15. The patient's neurological status is consistent with or improved from baseline levels established preoperatively. 16.  The patient/caregiver demonstrates knowledge of the expected responses to the operative or invasive procedure. 16.  Patient/caregiver has reduced anxiety. Interventions - familiarize with environment and equipment.   Electronically signed by Grier Mcburney, RN on 11/2/2017 at 9:54 AM

## 2017-11-02 NOTE — ANESTHESIA PRE-OP
Department of Anesthesiology  Preprocedure Note       Name:  Elsa Schneider   Age:  49 y.o.  :  1960                                          MRN:  [id number]         Date:  2017          [facility] Department of Anesthesiology  Pre-Anesthesia Evaluation/Consultation       Name:  Elsa Schneider                                         Age:  49 y.o. MRN:  [id number]           Procedure (Scheduled): right ulnar nerve decompression  Surgeon:  Dr. Hansa Conti     No Known Allergies  Patient Active Problem List   Diagnosis    Hot flashes    Vitamin D deficiency- 15 on 3/14/14    Fibromyalgia    Screen for colon cancer    Insomnia    Major depression ([facility] [address]    Screening for breast cancer    Tobacco dependence    Medial epicondylitis of elbow    Ulnar neuropathy at elbow    Lateral epicondylitis of both elbows     Past Medical History:   Diagnosis Date    Abdominal pain, chronic, generalized     managed with OTC 'digestive enzymes'    Anxiety     Depression     Fibromyalgia     Interstitial cystitis     Dr Tiff Bahena    Vitamin D deficiency 3/19/2014     Past Surgical History:   Procedure Laterality Date    BREAST ENHANCEMENT SURGERY      BREAST LUMPECTOMY      left breast; benign cyst    CARPAL TUNNEL RELEASE      bilateral; in     HYSTERECTOMY      for fibroids; ovaries remain    ULNAR TUNNEL RELEASE Left 10/12/2017    URETHRA SURGERY      Dr Tiff Bahena; for incontinence     Social History   Substance Use Topics    Smoking status: Current Every Day Smoker     Packs/day: 1.00     Years: 30.00     Types: Cigarettes    Smokeless tobacco: Never Used      Comment: encouraged to consider quitting.  not ready yet, but starting to use vapor nicotine to decrease use ()    Alcohol use 0.0 oz/week      Comment: rare use     Medications  Current Outpatient Prescriptions on File Prior to Encounter   Medication Sig Dispense Refill    HYDROcodone-acetaminophen ([address]) 5-325 MG per tablet Take 1 tablet by mouth every 6 hours . 20 tablet 0    gabapentin (NEURONTIN) 400 MG capsule 1 cap q am and 2 caps nightly. 90 capsule 2    nortriptyline (PAMELOR) 50 MG capsule Take 1 capsule by mouth nightly 90 capsule 0     No current facility-administered medications on file prior to encounter. Current Outpatient Prescriptions   Medication Sig Dispense Refill    HYDROcodone-acetaminophen (NORCO) 5-325 MG per tablet Take 1 tablet by mouth every 6 hours . 20 tablet 0    gabapentin (NEURONTIN) 400 MG capsule 1 cap q am and 2 caps nightly. 90 capsule 2    nortriptyline (PAMELOR) 50 MG capsule Take 1 capsule by mouth nightly 90 capsule 0     Current Facility-Administered Medications   Medication Dose Route Frequency Provider Last Rate Last Dose    fentaNYL (SUBLIMAZE) injection 25 mcg  25 mcg Intravenous Q5 Min PRN Charise Apgar, MD        HYDROmorphone (DILAUDID) injection 0.5 mg  0.5 mg Intravenous Q5 Min PRN Charise Apgar, MD        fentaNYL (SUBLIMAZE) injection 25 mcg  25 mcg Intravenous Q5 Min PRN Charise Apgar, MD        HYDROmorphone (DILAUDID) injection 0.5 mg  0.5 mg Intravenous Q5 Min PRN Charise Apgar, MD         Vital Signs (Current) There were no vitals filed for this visit. Vital Signs Statistics (for past 48 hrs)     No Data Recorded    BP Readings from Last 3 Encounters:   10/12/17 134/71   10/03/17 114/64   17 130/79     BMI  There is no height or weight on file to calculate BMI. Estimated body mass index is 22.13 kg/m² as calculated from the following:    Height as of 10/20/17: 5' 5\" (1.651 m). Weight as of 10/20/17: 133 lb (60.3 kg).     CBC   Lab Results   Component Value Date    WBC 4.9 10/30/2012    RBC 4.32 10/30/2012    HGB 13.6 10/30/2012    HCT 40.1 10/30/2012    MCV 92.8 10/30/2012    RDW 14.3 10/30/2012     10/30/2012     CMP    Lab Results   Component Value Date     2014    K 4.1 2014     2014 Vascular:                                        Anesthesia Plan      MAC     ASA 2       Induction: intravenous. MIPS: Prophylactic antiemetics administered. Anesthetic plan and risks discussed with patient and child/children. Plan discussed with CRNA. DOS STAFF ADDENDUM:    Pt seen and examined, chart reviewed (including anesthesia, drug and allergy history). No interval changes to history and physical examination. Anesthetic plan, risks, benefits, alternatives, and personnel involved discussed with patient. Patient verbalized an understanding and agrees to proceed.       Ramón Quiles MD  November 2, 2017  8:53 AM      Ramón Quiles MD   11/2/2017

## 2017-11-02 NOTE — H&P
Pre-operative Update of H&P:    I  have seen & examined Ms. Wilber Buck related solely to her hand and upper extremity conditions, prior to the scheduled procedure on the date of her surgery. The indications for the planned surgical procedure & and her upper-extremity conditionare unchanged. Please see the Anesthesia Pre-Op Note from date of surgery for Ms. Iveth Deangelo Norris's systemic evaluation.

## 2017-11-02 NOTE — PROGRESS NOTES
Pt arrived to pacu from OR sedate with opa. VSS on monitor. O2 on 3L nc. Dressing right arm clean and dry good radial pulse elevated on pillow. Pt still sedate.

## 2017-11-02 NOTE — PROGRESS NOTES
1. Identify name and date of birth. 2.  Patient remains free from signs and symptoms of injury. 3.  Patient receives appropriate medication(s), safely administered during the       Perioperative period. 4.  The patient is free from signs and symptoms of infection. 5.  The patient has wound/tissur perfusion. 6.  The patient's fluid, electrolyte, and acid-base balances are established perioperatively. 7.  The patient's pulmonary function is established preoperatively. 8.  The patient's cardiovascular status is established perioperatively. 9.  The patient/caregiver demonstrates knowledge of nutritional management related to the operative or other invasive procedure. 10. The patient/caregiver demonstrates knowledge of medication management. 11. The patient/caregiver demonstrates knowledge of pain management. 12.  The patient participates in the rehabilitation process as applicable. 13.  The patient/caregiver participates in decisions in decisions affecting his or her Perioperative plan of care. 14.  The patients care is consistent with the individualized Perioperative plan of care. 15.  The patients right to privacy is maintained. 16.  The patient is the recipient of competent and ethical care within legal standards of practice. 17.  The patient's value system, lifestyle, ethnicity, and culture are considered, respected, and incorporated int the perioperative plan of care and understands special services available. 18.  The patient demonstrates and/or reports adequate pain control throughout the perioperative period. 19. The patient's neurological status is established preoperatively. 20. The patient/caregiver demonstrates knowledge of the expected responses to the operative or invasive procedure. 21.  Patient/caregiver has reduced anxiety. Interventions - Familiarize with environment and equipment. 22.  Patient/caregiver verbalizes understanding of Phase I and Phase II process.   23.  Patient

## 2017-11-10 ENCOUNTER — OFFICE VISIT (OUTPATIENT)
Dept: ORTHOPEDIC SURGERY | Age: 57
End: 2017-11-10

## 2017-11-10 VITALS — WEIGHT: 133 LBS | RESPIRATION RATE: 18 BRPM | BODY MASS INDEX: 22.16 KG/M2 | HEIGHT: 65 IN

## 2017-11-10 DIAGNOSIS — G56.23 CUBITAL TUNNEL SYNDROME OF BOTH UPPER EXTREMITIES: Primary | ICD-10-CM

## 2017-11-10 PROCEDURE — 99024 POSTOP FOLLOW-UP VISIT: CPT | Performed by: ORTHOPAEDIC SURGERY

## 2017-11-10 NOTE — PATIENT INSTRUCTIONS
Postoperative Instructions After Ulnar Nerve Decompression    Dr. Annie Cloud. Akin        1. After bandages are removed one week from surgery, you may chose to wear a small bandage over the incision if you wish, though you do not need to. 2. Keep incision dry for a total of 12 days from the day of surgery. Thereafter, you may wash with mild soap and water and shower normally. 3. Once your stiches have fully disappeared, you should begin gently massaging the incision with Vitamin E (may use Vitamin E lotion or contents of Vitamin E capsule). 4. Work hard on motion of the fingers and wrist & elbow, straightening each finger fully and bending each finger fully, bending wrist forward and bending wrist backwards, fully straightening elbow and fully bending elbow. Do not be concerned if you experience discomfort. This will not damage the surgery. 5. You may begin using the hand & arm as it feels comfortable beginning 12-14 days from the day of surgery. You may not feel entirely comfortable gripping or lifting heavy objects for several weeks. 6. The stitches are dissolvable and may take up to three weeks from day of surgery to completely disappear. 7. You may expect to see some skin peel off around the incision. You may be left with a small area of pink baby skin.  This is quite normal.

## 2017-11-10 NOTE — Clinical Note
Dear  Maikol Davies MD,  Thank you very much for your referral or Ms. Zohra Clark to me for evaluation and treatment of her Hand & Wrist condition. I appreciate your confidence in me and thank you for allowing me the opportunity to care for your patients. If I can be of any further assistance to you on this or any other patient, please do not hesitate to contact me. Sincerely,  Derrick Saleem.  Darci Palacios MD

## 2017-11-10 NOTE — PROGRESS NOTES
Ms. Kenna Donaldson returns today in follow-up of her recent right Ulnar Nerve Decompression (Cubital Tunnel Release) done approximately 1 week ago. She has done well noting no discomfort and no other reported complications. She notes pre-operative symptoms to be Improved at this time. Physical Exam:  Skin incision is healing well, no significant drainage, no significant erythema. Digital range of motion is full and equal bilateral.  Wrist range of motion is full and equal bilateral.  Elbow range of motion remains somewhat limited due to discomfort. Sensation is increased in the Ulnar Innervated Digits. Vascular examination reveals normal and good capillary refill. Swelling is mild. There is no clinical evidence of residual Ulnar Nerve dysfuntion. Impression:  Ms. Kenna Donaldson is doing well after recent right Ulnar Nerve Decompression (Cubital Tunnel Release). Plan:  Ms. Kenna Donaldson is instructed in work on Active & Passive range of motion of the digits, wrist, & elbow. These modalities were specifically demonstrated to her today. We discussed the appropriateness of gradual resumption of use of the operated hand and the return to normal use as comfort allows. She is given instructions regarding management of the fresh surgical incision and progressive use of desensitization and tissue massage techniques. We discussed the appropriate expectations and timeline for symptom improvement. She is provided a written patient instruction sheet titled: Postoperative Instructions After Ulnar Nerve Decompression. I have asked Ms. Kenna Donaldson to follow-up with me, either by scheduling an appointment for approximately 2-4 weeks from now, or by contacting me by telephone over the next 2-4 weeks if her symptoms have not fully resolved or if she has not regained full & painless return of function.       She is also specifically instructed to return to the office or call for an appointment sooner if her symptoms are changing or worsening prior to that time.

## 2018-01-04 RX ORDER — NORTRIPTYLINE HYDROCHLORIDE 50 MG/1
50 CAPSULE ORAL NIGHTLY
Qty: 30 CAPSULE | Refills: 1 | Status: SHIPPED | OUTPATIENT
Start: 2018-01-04 | End: 2018-06-13 | Stop reason: SINTOL

## 2018-03-16 ENCOUNTER — OFFICE VISIT (OUTPATIENT)
Dept: FAMILY MEDICINE CLINIC | Age: 58
End: 2018-03-16

## 2018-03-16 VITALS
DIASTOLIC BLOOD PRESSURE: 64 MMHG | SYSTOLIC BLOOD PRESSURE: 122 MMHG | HEART RATE: 104 BPM | TEMPERATURE: 99 F | WEIGHT: 136 LBS | BODY MASS INDEX: 22.66 KG/M2 | RESPIRATION RATE: 12 BRPM | OXYGEN SATURATION: 96 % | HEIGHT: 65 IN

## 2018-03-16 DIAGNOSIS — Z13.1 SCREENING FOR DIABETES MELLITUS: ICD-10-CM

## 2018-03-16 DIAGNOSIS — Z12.11 SCREEN FOR COLON CANCER: ICD-10-CM

## 2018-03-16 DIAGNOSIS — M79.7 FIBROMYALGIA: Primary | ICD-10-CM

## 2018-03-16 DIAGNOSIS — R73.9 HYPERGLYCEMIA: ICD-10-CM

## 2018-03-16 DIAGNOSIS — F17.200 TOBACCO DEPENDENCE: ICD-10-CM

## 2018-03-16 DIAGNOSIS — F51.04 PSYCHOPHYSIOLOGICAL INSOMNIA: ICD-10-CM

## 2018-03-16 DIAGNOSIS — M79.7 FIBROMYALGIA: ICD-10-CM

## 2018-03-16 DIAGNOSIS — F33.42 RECURRENT MAJOR DEPRESSIVE DISORDER, IN FULL REMISSION (HCC): ICD-10-CM

## 2018-03-16 PROBLEM — M77.12 LATERAL EPICONDYLITIS OF BOTH ELBOWS: Status: RESOLVED | Noted: 2017-09-07 | Resolved: 2018-03-16

## 2018-03-16 PROBLEM — M77.00 MEDIAL EPICONDYLITIS OF ELBOW: Status: RESOLVED | Noted: 2017-09-07 | Resolved: 2018-03-16

## 2018-03-16 PROBLEM — M77.11 LATERAL EPICONDYLITIS OF BOTH ELBOWS: Status: RESOLVED | Noted: 2017-09-07 | Resolved: 2018-03-16

## 2018-03-16 LAB
A/G RATIO: 1.7 (ref 1.1–2.2)
ALBUMIN SERPL-MCNC: 4.3 G/DL (ref 3.4–5)
ALP BLD-CCNC: 58 U/L (ref 40–129)
ALT SERPL-CCNC: 11 U/L (ref 10–40)
ANION GAP SERPL CALCULATED.3IONS-SCNC: 16 MMOL/L (ref 3–16)
AST SERPL-CCNC: 14 U/L (ref 15–37)
BILIRUB SERPL-MCNC: <0.2 MG/DL (ref 0–1)
BUN BLDV-MCNC: 14 MG/DL (ref 7–20)
CALCIUM SERPL-MCNC: 9 MG/DL (ref 8.3–10.6)
CHLORIDE BLD-SCNC: 100 MMOL/L (ref 99–110)
CO2: 27 MMOL/L (ref 21–32)
CREAT SERPL-MCNC: 0.6 MG/DL (ref 0.6–1.1)
GFR AFRICAN AMERICAN: >60
GFR NON-AFRICAN AMERICAN: >60
GLOBULIN: 2.6 G/DL
GLUCOSE BLD-MCNC: 111 MG/DL (ref 70–99)
POTASSIUM SERPL-SCNC: 4.1 MMOL/L (ref 3.5–5.1)
SODIUM BLD-SCNC: 143 MMOL/L (ref 136–145)
TOTAL PROTEIN: 6.9 G/DL (ref 6.4–8.2)
TSH REFLEX: 1.43 UIU/ML (ref 0.27–4.2)

## 2018-03-16 PROCEDURE — 4004F PT TOBACCO SCREEN RCVD TLK: CPT | Performed by: FAMILY MEDICINE

## 2018-03-16 PROCEDURE — 3014F SCREEN MAMMO DOC REV: CPT | Performed by: FAMILY MEDICINE

## 2018-03-16 PROCEDURE — G8420 CALC BMI NORM PARAMETERS: HCPCS | Performed by: FAMILY MEDICINE

## 2018-03-16 PROCEDURE — 99214 OFFICE O/P EST MOD 30 MIN: CPT | Performed by: FAMILY MEDICINE

## 2018-03-16 PROCEDURE — G8484 FLU IMMUNIZE NO ADMIN: HCPCS | Performed by: FAMILY MEDICINE

## 2018-03-16 PROCEDURE — G8427 DOCREV CUR MEDS BY ELIG CLIN: HCPCS | Performed by: FAMILY MEDICINE

## 2018-03-16 PROCEDURE — 3017F COLORECTAL CA SCREEN DOC REV: CPT | Performed by: FAMILY MEDICINE

## 2018-03-16 RX ORDER — NORTRIPTYLINE HYDROCHLORIDE 75 MG/1
75 CAPSULE ORAL NIGHTLY
Qty: 30 CAPSULE | Refills: 3 | Status: SHIPPED | OUTPATIENT
Start: 2018-03-16 | End: 2018-06-13 | Stop reason: SINTOL

## 2018-03-17 PROBLEM — R73.9 HYPERGLYCEMIA: Status: ACTIVE | Noted: 2018-03-17

## 2018-03-19 ENCOUNTER — OFFICE VISIT (OUTPATIENT)
Dept: ORTHOPEDIC SURGERY | Age: 58
End: 2018-03-19

## 2018-03-19 VITALS — WEIGHT: 136 LBS | RESPIRATION RATE: 16 BRPM | BODY MASS INDEX: 22.66 KG/M2 | HEIGHT: 65 IN

## 2018-03-19 DIAGNOSIS — G56.21 LESION OF RIGHT ULNAR NERVE: Primary | ICD-10-CM

## 2018-03-19 PROCEDURE — 3017F COLORECTAL CA SCREEN DOC REV: CPT | Performed by: ORTHOPAEDIC SURGERY

## 2018-03-19 PROCEDURE — 3014F SCREEN MAMMO DOC REV: CPT | Performed by: ORTHOPAEDIC SURGERY

## 2018-03-19 PROCEDURE — G8420 CALC BMI NORM PARAMETERS: HCPCS | Performed by: ORTHOPAEDIC SURGERY

## 2018-03-19 PROCEDURE — 99213 OFFICE O/P EST LOW 20 MIN: CPT | Performed by: ORTHOPAEDIC SURGERY

## 2018-03-19 PROCEDURE — G8427 DOCREV CUR MEDS BY ELIG CLIN: HCPCS | Performed by: ORTHOPAEDIC SURGERY

## 2018-03-19 PROCEDURE — G8484 FLU IMMUNIZE NO ADMIN: HCPCS | Performed by: ORTHOPAEDIC SURGERY

## 2018-03-19 PROCEDURE — 4004F PT TOBACCO SCREEN RCVD TLK: CPT | Performed by: ORTHOPAEDIC SURGERY

## 2018-03-19 NOTE — PROGRESS NOTES
Ms. Joaquin Brice returns today in follow-up of her previously treated  bilateral Cubital Tunnel Syndrome. She was last seen in November, 2017 at which time she was treated with Ulnar Nerve decompression at the Cubital Tunnel. She experienced minimal relief of her initial symptoms. She states that it \"feels like I never had surgery\". She returns today with minimally improved symptoms of bilateral Ulnar Nerve Entrapment, requesting further treatment. The patient's , past medical history, medications, allergies,  family history, social history, and review of systems have been reviewed and are recorded in the chart. Physical Exam:  Vitals  Resp: 16  Height: 5' 5\" (165.1 cm)  Weight: 136 lb (61.7 kg)  Ms. Joaquin Brice appears well, she is in no apparent distress, she demonstrates appropriate mood & affect. Skin: Skin color, texture, turgor normal. No rashes or lesions bilaterally  Digital range of motion is full and equal bilateral bilaterally  Wrist range of motion is full and equal bilateral bilaterally  Elbow range of motion is full and equal bilateral bilaterally  There is no evidence of gross joint instability bilaterally. Sensation is subjectively tingling in the Ulnar Innervated Digits bilaterally and objectively normal in the same distribution bilaterally  Vascular examination reveals normal, good capillary refill and good color bilaterally  Swelling is mild in the medial elbow, there is mild tenderness at the medial epicondyle on the Left, greater than Right  Examination for Cubital Tunnel Syndrome shows mild tenderness to palpation at the medial epicondyle on the Left, normal on the Right. The Ulnar Nerve rests behind the medial epicondyle without subluxation upon elbow flexion bilaterally. Elbow flexion-compression test is negative , and there is an active Tinnel's Sign over the Cubital Tunnel on the Left, greater than Right.   The Ulnar Nerve innervated intrinsic musculature is not atrophied & weakened bilaterally  Examination for Stenosing Tenosynovitis demonstrates no evidence of tenderness, thickening or nodularity at the A-1 pulleys of the digits bilaterally. There no palpable triggering or any finger or thumb. Impression:  Ms. Scott London is showing evidence of slow recovery of cubital tunnel syndrome after ulnar nerve decompression. She requests additional treatment at this time. Plan:  I believe that Ms. Scott London is slow to see nerve recovery signs. I have explained to Ms. Scott London  that Nerve Recovery after a significant injury or compression may take an extended period of time to be complete. I have reminded her that her symptoms should be expected to slowly continue to improve for up to a year or more. She voiced an understanding of this and will keep me informed of her progress. I have also discussed with Ms. Scott London the other treatment options available to her for this condition. We have today selected to proceed with conservative management. She and I have agreed that if our current course of conservative treatment does not prove to be effective over the short term future, that she will schedule a follow-up appointment to discuss and select an alternate course of therapy including possibly injection or surgical treatment. I have discussed with Ms. Scott London that I can not be certain of the diagnosis of carpal tunnel syndrome clinically nor can I exclude another site of nerve compression by my evaluation today. We have discussed the option of pursuing  Electrodiagnostic Studies to more fully evaluate her presenting symptoms and the underlying cause, and a prescription for EMG & Nerve Conduction Studies was offered and declined by the patient. Ms. Scott London has been given a full verbal list of instructions and precautions related to her present condition.   I have asked her to followup with me in the office at

## 2018-05-08 RX ORDER — GABAPENTIN 400 MG/1
CAPSULE ORAL
Qty: 90 CAPSULE | Refills: 2 | Status: SHIPPED | OUTPATIENT
Start: 2018-05-08 | End: 2019-02-27 | Stop reason: SDUPTHER

## 2018-06-13 ENCOUNTER — OFFICE VISIT (OUTPATIENT)
Dept: FAMILY MEDICINE CLINIC | Age: 58
End: 2018-06-13

## 2018-06-13 VITALS
HEIGHT: 65 IN | TEMPERATURE: 98 F | WEIGHT: 136 LBS | HEART RATE: 85 BPM | SYSTOLIC BLOOD PRESSURE: 110 MMHG | DIASTOLIC BLOOD PRESSURE: 74 MMHG | RESPIRATION RATE: 12 BRPM | BODY MASS INDEX: 22.66 KG/M2 | OXYGEN SATURATION: 98 %

## 2018-06-13 DIAGNOSIS — M79.7 FIBROMYALGIA: Primary | ICD-10-CM

## 2018-06-13 DIAGNOSIS — F33.1 MODERATE EPISODE OF RECURRENT MAJOR DEPRESSIVE DISORDER (HCC): ICD-10-CM

## 2018-06-13 PROCEDURE — G8427 DOCREV CUR MEDS BY ELIG CLIN: HCPCS | Performed by: FAMILY MEDICINE

## 2018-06-13 PROCEDURE — 4004F PT TOBACCO SCREEN RCVD TLK: CPT | Performed by: FAMILY MEDICINE

## 2018-06-13 PROCEDURE — G8420 CALC BMI NORM PARAMETERS: HCPCS | Performed by: FAMILY MEDICINE

## 2018-06-13 PROCEDURE — 99214 OFFICE O/P EST MOD 30 MIN: CPT | Performed by: FAMILY MEDICINE

## 2018-06-13 PROCEDURE — 3017F COLORECTAL CA SCREEN DOC REV: CPT | Performed by: FAMILY MEDICINE

## 2018-06-13 RX ORDER — GABAPENTIN 600 MG/1
600 TABLET ORAL 3 TIMES DAILY
Qty: 90 TABLET | Refills: 2 | Status: SHIPPED | OUTPATIENT
Start: 2018-06-13 | End: 2019-04-16

## 2018-06-26 ENCOUNTER — TELEPHONE (OUTPATIENT)
Dept: FAMILY MEDICINE CLINIC | Age: 58
End: 2018-06-26

## 2018-06-26 RX ORDER — TIZANIDINE 4 MG/1
4 TABLET ORAL 3 TIMES DAILY
Qty: 90 TABLET | Refills: 2 | Status: SHIPPED | OUTPATIENT
Start: 2018-06-26 | End: 2019-04-16

## 2019-02-27 DIAGNOSIS — M79.7 FIBROMYALGIA: Primary | ICD-10-CM

## 2019-02-27 RX ORDER — GABAPENTIN 400 MG/1
CAPSULE ORAL
Qty: 90 CAPSULE | Refills: 0 | Status: SHIPPED | OUTPATIENT
Start: 2019-02-27 | End: 2019-04-16

## 2019-04-16 ENCOUNTER — OFFICE VISIT (OUTPATIENT)
Dept: FAMILY MEDICINE CLINIC | Age: 59
End: 2019-04-16
Payer: COMMERCIAL

## 2019-04-16 VITALS
SYSTOLIC BLOOD PRESSURE: 124 MMHG | TEMPERATURE: 98.6 F | HEART RATE: 73 BPM | RESPIRATION RATE: 16 BRPM | BODY MASS INDEX: 22.16 KG/M2 | OXYGEN SATURATION: 98 % | WEIGHT: 133 LBS | HEIGHT: 65 IN | DIASTOLIC BLOOD PRESSURE: 78 MMHG

## 2019-04-16 DIAGNOSIS — M77.12 LATERAL EPICONDYLITIS OF BOTH ELBOWS: ICD-10-CM

## 2019-04-16 DIAGNOSIS — F41.9 CHRONIC ANXIETY: ICD-10-CM

## 2019-04-16 DIAGNOSIS — M77.11 LATERAL EPICONDYLITIS OF BOTH ELBOWS: ICD-10-CM

## 2019-04-16 DIAGNOSIS — F51.04 PSYCHOPHYSIOLOGICAL INSOMNIA: ICD-10-CM

## 2019-04-16 DIAGNOSIS — F17.200 TOBACCO DEPENDENCE: ICD-10-CM

## 2019-04-16 DIAGNOSIS — M79.7 FIBROMYALGIA: Primary | ICD-10-CM

## 2019-04-16 PROCEDURE — 99214 OFFICE O/P EST MOD 30 MIN: CPT | Performed by: FAMILY MEDICINE

## 2019-04-16 RX ORDER — GABAPENTIN 600 MG/1
600 TABLET ORAL 3 TIMES DAILY
Qty: 90 TABLET | Refills: 2 | Status: SHIPPED | OUTPATIENT
Start: 2019-04-16 | End: 2019-10-14 | Stop reason: SDUPTHER

## 2019-04-16 RX ORDER — DESVENLAFAXINE 25 MG/1
25 TABLET, EXTENDED RELEASE ORAL DAILY
Qty: 30 TABLET | Refills: 3 | Status: SHIPPED | OUTPATIENT
Start: 2019-04-16 | End: 2019-10-28

## 2019-04-16 RX ORDER — VARENICLINE TARTRATE 25 MG
KIT ORAL
Qty: 53 TABLET | Refills: 0 | Status: SHIPPED | OUTPATIENT
Start: 2019-04-16 | End: 2019-10-28

## 2019-04-16 RX ORDER — VARENICLINE TARTRATE 1 MG/1
1 TABLET, FILM COATED ORAL 2 TIMES DAILY
Qty: 60 TABLET | Refills: 3 | Status: SHIPPED | OUTPATIENT
Start: 2019-04-16 | End: 2019-10-28

## 2019-04-16 RX ORDER — MELOXICAM 15 MG/1
15 TABLET ORAL DAILY
Qty: 30 TABLET | Refills: 1 | Status: SHIPPED | OUTPATIENT
Start: 2019-04-16 | End: 2019-10-28

## 2019-04-16 NOTE — PROGRESS NOTES
Subjective:      Patient ID: Eliazar Soto is a 66 y.o. female. Blood pressure 124/78, pulse 73, temperature 98.6 °F (37 °C), temperature source Oral, resp. rate 16, height 5' 5\" (1.651 m), weight 133 lb (60.3 kg), SpO2 98 %, not currently breastfeeding. HPI Here for routine follow up of depression/fibromyalgia. Last seen 6/18. Not currently taking meds, except for Gabapentin 400 TID, otc magnesium and vit D. Stopped zoloft. insurance did not cover [address] [place] [address]. Was on pamelor previously- did not like. Made her groggy. As did trazodone. Current symptoms include generalized body pain:  Forearms, hands, wrists, feet, shoulders, neck, low back, medial knee on left, ankles. No swelling of extremities or joints    Pain worse before rain. Is sleeping poorly. Has poor memory   Always anxious and tense inside. Like 'fight/flight' continuously. + stress: daughter is not having drug abuse issues, but has a new baby and is living with [facility]. She would like to try chantix to quit smoking. Smokes < 1ppd currently. Has never seen fibro specialist or rheum. Gabapentin helps some. Patient Active Problem List   Diagnosis    Hot flashes    Vitamin D deficiency- 14 on 3/14/14    Fibromyalgia    Insomnia    Major depression (HCC)    Tobacco dependence    Ulnar neuropathy at elbow    Hyperglycemia-  (3/18)      Body mass index is 22.13 kg/m². Wt Readings from Last 3 Encounters:   04/16/19 133 lb (60.3 kg)   06/13/18 136 lb (61.7 kg)   03/19/18 136 lb (61.7 kg)      BP Readings from Last 3 Encounters:   04/16/19 124/78   06/13/18 110/74   03/16/18 122/64      Current Outpatient Medications   Medication Sig Dispense Refill    gabapentin (NEURONTIN) 400 MG capsule 1 cap q am and 2 caps nightly. . 90 capsule 0     No current facility-administered medications for this visit. There is no immunization history on file for this patient.      Social History     Tobacco Use    Smoking status: Current Every Day Smoker     Packs/day: 1.00     Years: 30.00     Pack years: 30.00     Types: Cigarettes    Smokeless tobacco: Never Used    Tobacco comment: encouraged to consider quitting. not ready yet, but starting to use vapor nicotine to decrease use (8/16)   Substance Use Topics    Alcohol use: Yes     Alcohol/week: 0.0 oz     Comment: rare use    Drug use: No          Review of Systems    Objective:   Physical Exam   Constitutional: She is oriented to person, place, and time. She appears well-developed and well-nourished. No distress. Neck: Normal range of motion. Neck supple. Carotid bruit is not present. No thyromegaly present. Cardiovascular: Normal rate, regular rhythm, normal heart sounds and intact distal pulses. No murmur heard. Pulmonary/Chest: Effort normal and breath sounds normal. No respiratory distress. She has no wheezes. She has no rales. Musculoskeletal: Normal range of motion. She exhibits no edema. Usual tender points at neck, traps, rhomboids, above and below clavicles, epicondyles, trochanteric areas and medial left knee. Is tender along lateral epicondyles bilaterally, pain with grasp and resisted supination/pronation. Sensation normal in hands. Neurological: She is alert and oriented to person, place, and time. Skin: Skin is warm and dry. She is not diaphoretic. Psychiatric: She has a normal mood and affect. Her behavior is normal. Judgment and thought content normal.   Nursing note and vitals reviewed. Assessment:      1. Fibromyalgia  - discussed that I feel her diagnosis is correct and she manifests the classic signs and sx of TM.  - effective sleep is essential to pain control, but as we are making several med changes today and she has had side effects to sleep agents in past, will not begin Rx for this. - Planning to start pristiq today and titrate up gabapentin dose.   - at her request, referred to [facility] specialist; recommend  Rissover.    - gabapentin (NEURONTIN) 600 MG tablet; Take 1 tablet by mouth 3 times daily for 90 days. Dispense: 90 tablet; Refill: 2  - [facility] - Indy Myers MD, Physical Medicine and Rehabilitation, [facility]    2. Psychophysiological insomnia  - As above; defer treatment for now, however treatment of anxiety with SNRI therapy often helps sleep. 3. Lateral epicondylitis of both elbows  - likely has epicondylar tendonitis in addition to FM; recommend mobic 15 mg/d and PT.  - [facility]    4. Chronic anxiety  - start pristiq at 25 mg dose; titrate up slowly to reduce side effects. 5. Tobacco dependence  - discussed importance of tobacco cessation; willing to begin Chantix again.            Plan:      F/u 6 weeks        Gretchen Gomez MD

## 2019-10-11 ENCOUNTER — TELEPHONE (OUTPATIENT)
Dept: FAMILY MEDICINE CLINIC | Age: 59
End: 2019-10-11

## 2019-10-11 DIAGNOSIS — M79.7 FIBROMYALGIA: ICD-10-CM

## 2019-10-14 RX ORDER — GABAPENTIN 600 MG/1
600 TABLET ORAL 3 TIMES DAILY
Qty: 90 TABLET | Refills: 2 | Status: SHIPPED | OUTPATIENT
Start: 2019-10-14 | End: 2020-03-02 | Stop reason: SDUPTHER

## 2019-10-17 DIAGNOSIS — M79.7 FIBROMYALGIA: ICD-10-CM

## 2019-10-17 RX ORDER — GABAPENTIN 600 MG/1
600 TABLET ORAL 3 TIMES DAILY
Qty: 90 TABLET | Refills: 2 | OUTPATIENT
Start: 2019-10-17 | End: 2020-01-15

## 2019-10-28 ENCOUNTER — OFFICE VISIT (OUTPATIENT)
Dept: FAMILY MEDICINE CLINIC | Age: 59
End: 2019-10-28
Payer: COMMERCIAL

## 2019-10-28 VITALS
HEIGHT: 65 IN | OXYGEN SATURATION: 98 % | DIASTOLIC BLOOD PRESSURE: 74 MMHG | TEMPERATURE: 98.5 F | SYSTOLIC BLOOD PRESSURE: 102 MMHG | HEART RATE: 86 BPM | BODY MASS INDEX: 21.49 KG/M2 | WEIGHT: 129 LBS

## 2019-10-28 DIAGNOSIS — M79.7 FIBROMYALGIA: ICD-10-CM

## 2019-10-28 DIAGNOSIS — F17.200 TOBACCO DEPENDENCE: ICD-10-CM

## 2019-10-28 DIAGNOSIS — Z78.0 POSTMENOPAUSAL STATE: ICD-10-CM

## 2019-10-28 DIAGNOSIS — M48.061 SPINAL STENOSIS OF LUMBAR REGION WITHOUT NEUROGENIC CLAUDICATION: Primary | ICD-10-CM

## 2019-10-28 PROCEDURE — 99214 OFFICE O/P EST MOD 30 MIN: CPT | Performed by: FAMILY MEDICINE

## 2020-03-02 ENCOUNTER — OFFICE VISIT (OUTPATIENT)
Dept: FAMILY MEDICINE CLINIC | Age: 60
End: 2020-03-02
Payer: COMMERCIAL

## 2020-03-02 ENCOUNTER — TELEPHONE (OUTPATIENT)
Dept: PAIN MANAGEMENT | Age: 60
End: 2020-03-02

## 2020-03-02 VITALS
RESPIRATION RATE: 16 BRPM | OXYGEN SATURATION: 98 % | HEART RATE: 85 BPM | DIASTOLIC BLOOD PRESSURE: 68 MMHG | BODY MASS INDEX: 20.33 KG/M2 | HEIGHT: 65 IN | SYSTOLIC BLOOD PRESSURE: 114 MMHG | WEIGHT: 122 LBS

## 2020-03-02 DIAGNOSIS — Z11.59 ENCOUNTER FOR HEPATITIS C SCREENING TEST FOR LOW RISK PATIENT: ICD-10-CM

## 2020-03-02 DIAGNOSIS — R63.4 WEIGHT LOSS: ICD-10-CM

## 2020-03-02 DIAGNOSIS — R73.9 HYPERGLYCEMIA: ICD-10-CM

## 2020-03-02 DIAGNOSIS — E55.9 VITAMIN D DEFICIENCY: ICD-10-CM

## 2020-03-02 DIAGNOSIS — Z13.220 SCREENING, LIPID: ICD-10-CM

## 2020-03-02 LAB
A/G RATIO: 2 (ref 1.1–2.2)
ALBUMIN SERPL-MCNC: 4.5 G/DL (ref 3.4–5)
ALP BLD-CCNC: 53 U/L (ref 40–129)
ALT SERPL-CCNC: 11 U/L (ref 10–40)
ANION GAP SERPL CALCULATED.3IONS-SCNC: 13 MMOL/L (ref 3–16)
AST SERPL-CCNC: 15 U/L (ref 15–37)
BILIRUB SERPL-MCNC: 0.4 MG/DL (ref 0–1)
BUN BLDV-MCNC: 16 MG/DL (ref 7–20)
CALCIUM SERPL-MCNC: 9.6 MG/DL (ref 8.3–10.6)
CHLORIDE BLD-SCNC: 101 MMOL/L (ref 99–110)
CHOLESTEROL, TOTAL: 238 MG/DL (ref 0–199)
CO2: 27 MMOL/L (ref 21–32)
CREAT SERPL-MCNC: 0.8 MG/DL (ref 0.6–1.1)
GFR AFRICAN AMERICAN: >60
GFR NON-AFRICAN AMERICAN: >60
GLOBULIN: 2.3 G/DL
GLUCOSE BLD-MCNC: 92 MG/DL (ref 70–99)
HDLC SERPL-MCNC: 71 MG/DL (ref 40–60)
HEPATITIS C ANTIBODY INTERPRETATION: NORMAL
LDL CHOLESTEROL CALCULATED: 147 MG/DL
POTASSIUM SERPL-SCNC: 4.8 MMOL/L (ref 3.5–5.1)
SODIUM BLD-SCNC: 141 MMOL/L (ref 136–145)
TOTAL PROTEIN: 6.8 G/DL (ref 6.4–8.2)
TRIGL SERPL-MCNC: 102 MG/DL (ref 0–150)
TSH SERPL DL<=0.05 MIU/L-ACNC: 1.15 UIU/ML (ref 0.27–4.2)
VITAMIN D 25-HYDROXY: 38.2 NG/ML
VLDLC SERPL CALC-MCNC: 20 MG/DL

## 2020-03-02 PROCEDURE — 99214 OFFICE O/P EST MOD 30 MIN: CPT | Performed by: FAMILY MEDICINE

## 2020-03-02 RX ORDER — GABAPENTIN 600 MG/1
TABLET ORAL
COMMUNITY
Start: 2020-01-22 | End: 2020-03-02

## 2020-03-02 RX ORDER — NICOTINE 21 MG/24HR
1 PATCH, TRANSDERMAL 24 HOURS TRANSDERMAL EVERY 24 HOURS
Qty: 30 PATCH | Refills: 3 | Status: SHIPPED | OUTPATIENT
Start: 2020-03-02 | End: 2020-04-21

## 2020-03-02 RX ORDER — GABAPENTIN 600 MG/1
600 TABLET ORAL 3 TIMES DAILY
Qty: 90 TABLET | Refills: 2 | Status: SHIPPED | OUTPATIENT
Start: 2020-03-02 | End: 2020-06-16

## 2020-03-02 NOTE — PROGRESS NOTES
Subjective:      Patient ID: Julieth Noguera is a 64 y.o. female. Blood pressure 114/68, pulse 85, resp. rate 16, height 5' 5\" (1.651 m), weight 122 lb (55.3 kg), SpO2 98 %, not currently breastfeeding. HPI Here for routine follow up of MDD, fibromyalgia, lumbar disc herniations with spinal stenosis. Has had chronic back pain for ~20 years. Chart hx shows ortho and chiro eval in 2013. MRI done 4/20/13 of the lumbar spine notes mild L3/4 and L4/5 dessication with mild Modic signs and small HNP/bulges. Worst @ right L4 nerve root with NF encroachment. Conservative therapy wsa recommended, but never completed. A year later I diagnosed her with fibromyalgia as her pain was diffuse. Interestingly, she had and was treated for C diff prior to the onset of most of her pain issues (2012). DR Lopez ordered the MRI's last year- but I have not seen the reports. No longer feeling any radiation. She has never had PT for her low back aching pain. She has continued to feel low back pain that is constant, aching. [place] with lifting over 20 lbs. She works as a  and gets help for heavier dogs. Says her 'whole body' hurts. Worse pain is neck, elbows, hands. She is still taking gabapentin, which helps a little bit. She is interested in pain management. Mood: 'so so'  Ups and downs. + stress recently in caring for her grandson for the past 2 months (he is 10 months)- daughter having drug abuse issues. Denies mark depression. No SI.  'but I don't want to live like this the rest of my life.'  Feels anxious a bit. Sleep: reports decent sleep- 10 pm and and awakens 1-2 times for variable times. Hurts more after more sleepless nights. She has not tolerated TCA's in past.  Nor SNRI's Cymbalta or Savella. Exercise: none.     No results found for: CHOL, TRIG, HDL, LDLCALC, LDLDIRECT  Lab Results   Component Value Date    ALT 11 03/16/2018    AST 14 (L) 03/16/2018           No results found for: LABA1C     Last renal function test:   Lab Results   Component Value Date     03/16/2018    K 4.1 03/16/2018    BUN 14 03/16/2018    CREATININE 0.6 03/16/2018          Smokes 1/2 - 3/4 ppd. Thinks insurance will cover nicotine patch. Not eating well- weight keeps going down gradually. Patient Active Problem List   Diagnosis    Hot flashes    Vitamin D deficiency- 14 on 3/14/14    Fibromyalgia    Insomnia    Major depression (HCC)    Tobacco dependence    Ulnar neuropathy at elbow    Lateral epicondylitis of both elbows    Hyperglycemia-  (3/18)    Chronic anxiety      Body mass index is 20.3 kg/m². Wt Readings from Last 3 Encounters:   03/02/20 122 lb (55.3 kg)   10/28/19 129 lb (58.5 kg)   04/16/19 133 lb (60.3 kg)      BP Readings from Last 3 Encounters:   03/02/20 114/68   10/28/19 102/74   04/16/19 124/78      Current Outpatient Medications   Medication Sig Dispense Refill    gabapentin (NEURONTIN) 600 MG tablet Take 1 tablet by mouth 3 times daily for 90 days. 90 tablet 2     No current facility-administered medications for this visit. There is no immunization history on file for this patient. Social History     Tobacco Use    Smoking status: Current Every Day Smoker     Packs/day: 1.00     Years: 30.00     Pack years: 30.00     Types: Cigarettes    Smokeless tobacco: Never Used    Tobacco comment: encouraged to consider quitting. not ready yet, but starting to use vapor nicotine to decrease use (8/16)   Substance Use Topics    Alcohol use: Yes     Alcohol/week: 0.0 standard drinks     Comment: rare use    Drug use: No        Review of Systems As above     Objective:   Physical Exam  Vitals signs and nursing note reviewed. Constitutional:       Appearance: Normal appearance. She is well-developed. Neck:      Musculoskeletal: Normal range of motion and neck supple. Thyroid: No thyromegaly. Vascular: No carotid bruit. for MRI scan report. Seems to have improved- no longer with radicular pain. Jaya Chew MD, Pain Management, [facility]    5. Chronic anxiety  - discussed that rampant anxious/depressive mood can impede sleep and fibro control. Recommend Remeron (since she has had weight loss and sleep is likely not as good as she thinks). She declines this for now- has a general mis-trust of pharmaceuticals. 6. Mild episode of recurrent major depressive disorder ([facility] [address]  - As above    7. Screening, lipid  - Lipid Panel; Future    8. Vitamin D deficiency- 14 on 3/14/14  - Vitamin D 25 Hydroxy; Future    9. Weight loss  - TSH without Reflex; Future    10. Encounter for hepatitis C screening test for low risk patient  - Hepatitis C Antibody; Future          Plan:       Declines breast and colon cancer screening today. F/u 6 mo.         Ermelinda Potts MD

## 2020-03-03 LAB
ESTIMATED AVERAGE GLUCOSE: 116.9 MG/DL
HBA1C MFR BLD: 5.7 %

## 2020-03-04 ENCOUNTER — TELEPHONE (OUTPATIENT)
Dept: PAIN MANAGEMENT | Age: 60
End: 2020-03-04

## 2020-03-04 NOTE — TELEPHONE ENCOUNTER
Pt was called and informed me   \" I currently would prefer to be seen by Dr. Anuja Nicolas because it is closer to me and not as far. \"    I informed patient that this would be sent back to her PCP and I will inform the other office that she would like to be seen there. She stated understanding and said thank you.

## 2020-03-06 ENCOUNTER — TELEPHONE (OUTPATIENT)
Dept: FAMILY MEDICINE CLINIC | Age: 60
End: 2020-03-06

## 2020-03-06 ENCOUNTER — OFFICE VISIT (OUTPATIENT)
Dept: FAMILY MEDICINE CLINIC | Age: 60
End: 2020-03-06
Payer: COMMERCIAL

## 2020-03-06 VITALS
HEART RATE: 94 BPM | RESPIRATION RATE: 16 BRPM | HEIGHT: 65 IN | OXYGEN SATURATION: 98 % | WEIGHT: 123 LBS | BODY MASS INDEX: 20.49 KG/M2 | SYSTOLIC BLOOD PRESSURE: 114 MMHG | DIASTOLIC BLOOD PRESSURE: 68 MMHG

## 2020-03-06 PROCEDURE — 99213 OFFICE O/P EST LOW 20 MIN: CPT | Performed by: FAMILY MEDICINE

## 2020-03-06 RX ORDER — SULFAMETHOXAZOLE AND TRIMETHOPRIM 800; 160 MG/1; MG/1
1 TABLET ORAL 2 TIMES DAILY
Qty: 14 TABLET | Refills: 0 | Status: SHIPPED | OUTPATIENT
Start: 2020-03-06 | End: 2020-03-13

## 2020-03-06 NOTE — PROGRESS NOTES
nicotine to decrease use (8/16)   Substance Use Topics    Alcohol use: Yes     Alcohol/week: 0.0 standard drinks     Comment: rare use    Drug use: No        Review of Systems    Objective:   Physical Exam  Vitals signs and nursing note reviewed. Constitutional:       Appearance: Normal appearance. She is well-developed. Neck:      Musculoskeletal: Normal range of motion and neck supple. Thyroid: No thyromegaly. Vascular: No carotid bruit. Cardiovascular:      Rate and Rhythm: Normal rate and regular rhythm. Pulses: Normal pulses. Heart sounds: Normal heart sounds. No murmur. Pulmonary:      Effort: Pulmonary effort is normal. No respiratory distress. Breath sounds: Normal breath sounds. No wheezing or rales. Abdominal:      General: Abdomen is flat. Bowel sounds are normal. There is no distension. Tenderness: There is abdominal tenderness (suprapubic). There is no right CVA tenderness or left CVA tenderness. Musculoskeletal: Normal range of motion. Right lower leg: No edema. Left lower leg: No edema. Skin:     General: Skin is warm and dry. Neurological:      General: No focal deficit present. Mental Status: She is alert and oriented to person, place, and time. Mental status is at baseline. Psychiatric:         Mood and Affect: Mood normal.         Behavior: Behavior normal.         Thought Content: Thought content normal.         Judgment: Judgment normal.         Assessment:      UTI      Plan:      Took azo- urine orange  Send cx  Start bactrim  Hydrate well  Call if worsens.         Jermaine Siddiqi MD

## 2020-03-06 NOTE — PROGRESS NOTES
Had to [facility] at [address] [place] [address] [address] [phone]   with all the info to obtain MRI and asked for a call back. Not sure if this has been done yet bc no documentation and there is no MRI scanned in pt's media, but hopefully if it hasn't been taken care of I can be of some assistance.

## 2020-03-09 LAB
ORGANISM: ABNORMAL
URINE CULTURE, ROUTINE: ABNORMAL

## 2020-03-23 ENCOUNTER — TELEPHONE (OUTPATIENT)
Dept: FAMILY MEDICINE CLINIC | Age: 60
End: 2020-03-23

## 2020-03-23 RX ORDER — NITROFURANTOIN 25; 75 MG/1; MG/1
100 CAPSULE ORAL 2 TIMES DAILY
Qty: 20 CAPSULE | Refills: 0 | Status: SHIPPED | OUTPATIENT
Start: 2020-03-23 | End: 2020-04-02

## 2020-03-26 ENCOUNTER — TELEPHONE (OUTPATIENT)
Dept: PAIN MANAGEMENT | Age: 60
End: 2020-03-26

## 2020-04-21 ENCOUNTER — OFFICE VISIT (OUTPATIENT)
Dept: PAIN MANAGEMENT | Age: 60
End: 2020-04-21
Payer: COMMERCIAL

## 2020-04-21 VITALS
HEIGHT: 65 IN | DIASTOLIC BLOOD PRESSURE: 64 MMHG | BODY MASS INDEX: 20.49 KG/M2 | SYSTOLIC BLOOD PRESSURE: 105 MMHG | WEIGHT: 123 LBS | HEART RATE: 69 BPM

## 2020-04-21 PROBLEM — M50.30 DDD (DEGENERATIVE DISC DISEASE), CERVICAL: Status: ACTIVE | Noted: 2020-04-21

## 2020-04-21 PROCEDURE — 99244 OFF/OP CNSLTJ NEW/EST MOD 40: CPT | Performed by: INTERNAL MEDICINE

## 2020-04-21 RX ORDER — GABAPENTIN 600 MG/1
600 TABLET ORAL 2 TIMES DAILY
Qty: 60 TABLET | Refills: 3 | Status: SHIPPED | OUTPATIENT
Start: 2020-04-21 | End: 2020-05-19 | Stop reason: SDUPTHER

## 2020-04-21 RX ORDER — HYDROCODONE BITARTRATE AND ACETAMINOPHEN 5; 325 MG/1; MG/1
.5-1 TABLET ORAL EVERY 6 HOURS PRN
Qty: 30 TABLET | Refills: 0 | Status: SHIPPED | OUTPATIENT
Start: 2020-04-21 | End: 2020-05-13 | Stop reason: SDUPTHER

## 2020-04-21 RX ORDER — QUETIAPINE FUMARATE 25 MG/1
12.5-25 TABLET, FILM COATED ORAL NIGHTLY
Qty: 30 TABLET | Refills: 0 | Status: SHIPPED | OUTPATIENT
Start: 2020-04-21 | End: 2020-05-19 | Stop reason: SDUPTHER

## 2020-04-21 RX ORDER — CELECOXIB 200 MG/1
200 CAPSULE ORAL DAILY
Qty: 60 CAPSULE | Refills: 1 | Status: SHIPPED | OUTPATIENT
Start: 2020-04-21 | End: 2020-06-16

## 2020-04-22 ENCOUNTER — TELEPHONE (OUTPATIENT)
Dept: PAIN MANAGEMENT | Age: 60
End: 2020-04-22

## 2020-04-27 NOTE — PROGRESS NOTES
Ms. Terence Lawrence is a 64 y.o. female who is seen in consultation at the request of Dr. Liza Victor for pain management. Patient states that she has fibromyalgia complains of pains in the arm neck shoulders back and elbows states arms and shoulders hurt the most has bilateral ulnar nerve surgery and carpal tunnel surgery done in the past states she does not have diabetes describes the pain as aching stabbing pain no burning complains of feeling tired has had no neck or back surgeries has had no epidural or trigger point injections and has done physical therapy has been seen by multiple other physicians in the past primary care physician Latter-day physician and pain physician. Sleep is been poor does complain some headaches complains of morning stiffness and complains of some fatigue. Working as a  32 hours a week states she is  lives with her  managing her ADLs. Symptoms started gradually has a prior episodes which have been treated medications anti-inflammatories muscle relaxers TENS unit different other modalities most the pain is in neck shoulders and arms present on both sides activities such as lifting bending twisting straining reaching overhead doing yard work doing other ADLs causes her to have increased pain self with medication grades of pain 5/10 has been taking gabapentin 600 twice daily and ibuprofen gives a history of depression patient states she quit smoking about 2 weeks ago denies marijuana use states she drinks very seldom holidays mostly currently working works as a  does complain of weakness of lower extremity numbness and tingling in the neck and arm no instability gait problems no history of falls ongoing pain symptoms have restricted social and recreational life.   The patient's social history, past medical history, family history, medications, allergies and review of systems have all been reviewed and verified from  the patient questionnaire form which has been filled by the patient. The  allergies, medication list  and past medical and surgical history and other information recorded by the MA was again reviewed today  These forms have been scanned into the \"media\" tab of patients electronic medical record. PHYSICAL EXAM:  Please see the physical exam form for a detailed examination on this visit. This form has been completed and scanned into the  Media section of the chart  This is a middle-aged female well-developed no apparent acute distress alert oriented x3 mood and affect is normal gait is normal gross motor coordination is normal negative Mara signs  Back and trunk exam shows tenderness paralumbar region range of motion within normal limits motor strength 4/5 sensory exams grossly unremarkable reflexes +1 straight leg raising is about 45 degrees femoral stretch cause ipsilateral pain Garland's test is negative. /64   Pulse 69   Ht 5' 5\" (1.651 m)   Wt 123 lb (55.8 kg)   BMI 20.47 kg/m²   Skin: Warm and dry. Good turgor. No rashes. No lesions or marks noted  Eyes:  PERRLA, cornea/ conjunctiva normal, no nystagmus  HENT:  Atraumatic, external ears normal, nose normal, oropharynx moist, no pharyngeal exudates. Neck- normal range of motion, no tenderness, supple. No bruit, no JVD, No lymph nodes appreciated. Thyroid is not enlarged  Respiratory: Lungs CTAP, No respiratory distress, normal breath sounds, no rales, no wheezing   Cardiovascular:  Normal S1,S2, Normal rate, normal rhythm, no murmurs, no gallops, no rubs   GI:  Soft, nondistended, normal bowel sounds, nontender, no organomegaly, no mass, no rebound, no guarding  :  No costovertebral angle tenderness   Musculoskeletal:  No clubbing, no edema, no tenderness, no deformities. There are no varicosities  Lymphatic:  No lymphadenopathy noted   Neurologic:  Alert & oriented x 3, CN 2-12 normal, normal motor function, normal sensory function, no focal deficits noted   Psychiatric:  Speech and behavior appropriate, judgement and thought content normal.  There are tender spots of fibromyalgia on physical exam testing. Patient's old records were reviewed and detailed records from primary care physician reviewed imaging studies reviewed MRI of the lumbar spine was reviewed which shows disc protrusion L5-S1 encroaching on the right S1 nerve root there is also degenerative disc disease L3-4 L4-5. X-rays of the C-spine reviewed which shows spondylosis C4-5 with degenerative disc disease    IMPRESSION    CHRONIC PAIN SYNDROME  FIBROMYALGIA  DEGENERATIVE DISC DISEASE LUMBAR SPINE  DEGENERATIVE DISC DISEASE C-SPINE  INSOMNIA    Chronic opiate treatment protocol was discussed with the patient. Informed verbal consent was obtained. Treatment guidelines were established. Risks and benefits of the medications including narcotics were discussed with the patient. SOAPP questionnaire and opioid risk tool were assessed. Long-term and short-term goals of pain management were also addressed including pain relief about 30% from baseline, improving mood, sleep, psychosocial, and physical functioning were addressed. All treatment options are discussed with patient pathophysiology of pain was discussed with the patient education about chronic pain and fibromyalgia was given advised walking stretching and swimming exercises patient was given neck and back exercises to do states she is tried trazodone had problems with that has tried Flexeril tizanidine BuSpar Zoloft Cymbalta and Mobic in the past will start Neurontin will continue with that 600 twice daily Celebrex 200 mg 1 a day and Seroquel 25 mg 1/2 to 1 pill at bedtime start Norco 5 mg pills half to 1 p.o. twice daily as needed order CBC CMP UA TSH and follow-up on the results Patient profile on OARRS website was reviewed. We will do urine drug screen with [facility] opiates and follow-up on the results.   Goals of current treatment regimen include improvement in pain,

## 2020-05-13 ENCOUNTER — TELEPHONE (OUTPATIENT)
Dept: PAIN MANAGEMENT | Age: 60
End: 2020-05-13

## 2020-05-13 RX ORDER — HYDROCODONE BITARTRATE AND ACETAMINOPHEN 5; 325 MG/1; MG/1
.5-1 TABLET ORAL EVERY 6 HOURS PRN
Qty: 12 TABLET | Refills: 0 | Status: SHIPPED | OUTPATIENT
Start: 2020-05-13 | End: 2020-05-19 | Stop reason: SDUPTHER

## 2020-05-19 ENCOUNTER — VIRTUAL VISIT (OUTPATIENT)
Dept: PAIN MANAGEMENT | Age: 60
End: 2020-05-19
Payer: COMMERCIAL

## 2020-05-19 PROCEDURE — 99213 OFFICE O/P EST LOW 20 MIN: CPT | Performed by: INTERNAL MEDICINE

## 2020-05-19 RX ORDER — QUETIAPINE FUMARATE 25 MG/1
12.5-25 TABLET, FILM COATED ORAL NIGHTLY
Qty: 30 TABLET | Refills: 0 | Status: SHIPPED | OUTPATIENT
Start: 2020-05-19 | End: 2020-06-16 | Stop reason: SDUPTHER

## 2020-05-19 RX ORDER — HYDROCODONE BITARTRATE AND ACETAMINOPHEN 5; 325 MG/1; MG/1
.5-1 TABLET ORAL EVERY 6 HOURS PRN
Qty: 30 TABLET | Refills: 0 | Status: SHIPPED | OUTPATIENT
Start: 2020-05-19 | End: 2020-06-16

## 2020-05-19 RX ORDER — GABAPENTIN 600 MG/1
600 TABLET ORAL 2 TIMES DAILY
Qty: 60 TABLET | Refills: 3 | Status: SHIPPED | OUTPATIENT
Start: 2020-05-19 | End: 2020-09-08 | Stop reason: SDUPTHER

## 2020-05-19 NOTE — PROGRESS NOTES
TELE HEALTH VISIT (AUDIO-VISUAL)    Pursuant to the emergency declaration under the [facility] waiver authority and the [employer] Act, this Virtual  Visit was conducted, with patient's/legal guardian's consent, to reduce the patient's risk of exposure to COVID-19 and provide continuity of care for an established patient. Service is  provided through a video synchronous discussion virtually to substitute for in-person clinic visit. Due to this being a TeleHealth encounter (During XAVYG-38 public health emergency), evaluation of the following organ systems was limited: Vitals/Constitutional/EENT/Resp/CV/GI//MS/Neuro/Skin/Euhk-Odpm-Lkr. Marylene Mains  1960  <[id number]>    Ms. Lenoria Nissen is being seen virtually for a follow up visit using one of the following techniques-Doxy. me/[employer] Video visit/[employer] Du[employer] or Face time. Informed verbal consent to the virtual visit was obtained from Ms. Norris. Risks associated with HIPPA compliance with the virtual visit was explained to the patient. Ms. Lenoria Nissen is at her residence and Dr. Clifton Bradne is in his office. HISTORY OF PRESENT ILLNESS:  Ms. Lenoria Nissen is a 64 y.o. female  being assessed for a follow up visit for pain management for evaluation of ongoing care regarding her symptoms and monitoring of compliance with long term use high risk medications. She has a diagnosis of No diagnosis found. .      She complains of pain in the Neck, upper and low back   with radiation to the shoulders Bilateral, arms Bilateral, elbows Bilateral, hands Bilateral, buttocks, hips Bilateral, upper leg Bilateral, knees Bilateral, lower leg Bilateral, ankles Bilateral and feet Bilateral . She rates the pain 5/10 and describes it as aching. Current treatment regimen has helped relieve about 50% of the pain. She denies any side effects from the current pain regimen.  Patient reports that HYDROcodone-acetaminophen (NORCO) 5-325 MG per tablet Take 0.5-1 tablets by mouth every 6 hours as needed for Pain (Max 1-2 per day) for up to 6 days. 12 tablet 0    gabapentin (NEURONTIN) 600 MG tablet Take 1 tablet by mouth 2 times daily for 30 days. 60 tablet 3    QUEtiapine (SEROQUEL) 25 MG tablet Take 0.5-1 tablets by mouth nightly 30 tablet 0    celecoxib (CELEBREX) 200 MG capsule Take 1 capsule by mouth daily 60 capsule 1    gabapentin (NEURONTIN) 600 MG tablet Take 1 tablet by mouth 3 times daily for 90 days. 90 tablet 2     No current facility-administered medications for this visit. I will continue her current medication regimen  which is part of the above treatment schedule. It has been helping with Ms. Norris's chronic  medical problems which for this visit include: There were no encounter diagnoses. Risks and benefits of the medications and other alternative treatments  including no treatment were discussed with the patient. The common side effects of these medications were also explained to the patient. Informed verbal consent was obtained. Goals of current treatment regimen include improvement in pain, restoration of functioning- with focus on improvement in physical performance, general activity, work or disability,emotional distress, health care utilization and  decreased medication consumption. Will continue to monitor progress towards achieving/maintaining therapeutic goals with special emphasis on  1. Improvement in perceived interfernce  of pain with ADL's. Ability to do home exercises independently. Ability to do household chores indoor and/or outdoor work and social and leisure activities. Improve psychosocial and physical functioning. - she is showing progression towards this treatment goal with the current regimen.      She was advised against drinking alcohol with the narcotic pain medicines, advised against driving or handling machinery while adjusting the dose of medicines

## 2020-06-16 ENCOUNTER — VIRTUAL VISIT (OUTPATIENT)
Dept: PAIN MANAGEMENT | Age: 60
End: 2020-06-16
Payer: COMMERCIAL

## 2020-06-16 PROCEDURE — 99213 OFFICE O/P EST LOW 20 MIN: CPT | Performed by: INTERNAL MEDICINE

## 2020-06-16 RX ORDER — QUETIAPINE FUMARATE 25 MG/1
12.5-25 TABLET, FILM COATED ORAL NIGHTLY
Qty: 30 TABLET | Refills: 0 | Status: SHIPPED | OUTPATIENT
Start: 2020-06-16 | End: 2020-08-11 | Stop reason: SDUPTHER

## 2020-06-16 NOTE — PROGRESS NOTES
taken for this visit. Constitutional: She appears well-developed and well-nourished. No acute distress. No respiratory distress. Skin: Skin appears to be warm and dry. No rashes or any other marks noted. She is not diaphoretic. Respiratory/Pulmonary: NO conversational dyspnea, no accessory muscle use, no coughing during exam. She does not appear to be in labored breathing. Neurological/Psychiatric:She is alert and oriented to person, place, and time. Coordination is  normal.  Her mood isAppropriate and affect is Neutral/Euthymic(normal). Musculoskeletal / Extremities: Range of motion is normal. Gait is normal, assistive devices use: none. IMPRESSION:   1. Chronic pain syndrome    2. Fibromyalgia    3. DDD (degenerative disc disease), lumbar    4. DDD (degenerative disc disease), cervical        PLAN:  Informed verbal consent regarding treatment was obtained  -Rj Alvarez, does not think it helps   -Discontinue Celebrex   -Continue with Neurontin and Seroquel   -She was advised to increase fluids ( 5-7  glasses of fluid daily), limit caffeine, avoid cheese products, increase dietary fiber, increase activity and exercise as tolerated and relax regularly and enjoy meals   -Walking/stretching exercises   -Does not want to change any other medications. Current Outpatient Medications   Medication Sig Dispense Refill    gabapentin (NEURONTIN) 600 MG tablet Take 1 tablet by mouth 2 times daily for 30 days. 60 tablet 3    QUEtiapine (SEROQUEL) 25 MG tablet Take 0.5-1 tablets by mouth nightly 30 tablet 0     No current facility-administered medications for this visit. I will continue her current medication regimen  which is part of the above treatment schedule. It has been helping with Ms. Norris's chronic  medical problems which for this visit include:   Diagnoses of Chronic pain syndrome, Fibromyalgia, DDD (degenerative disc disease), lumbar, and DDD (degenerative disc disease), cervical were pertinent to this visit. Risks and benefits of the medications and other alternative treatments  including no treatment were discussed with the patient. The common side effects of these medications were also explained to the patient. Informed verbal consent was obtained. Goals of current treatment regimen include improvement in pain, restoration of functioning- with focus on improvement in physical performance, general activity, work or disability,emotional distress, health care utilization and  decreased medication consumption. Will continue to monitor progress towards achieving/maintaining therapeutic goals with special emphasis on  1. Improvement in perceived interfernce  of pain with ADL's. Ability to do home exercises independently. Ability to do household chores indoor and/or outdoor work and social and leisure activities. Improve psychosocial and physical functioning. - she is showing progression towards this treatment goal with the current regimen. She was advised against drinking alcohol with the narcotic pain medicines, advised against driving or handling machinery while adjusting the dose of medicines or if having cognitive  issues related to the current medications. Risk of overdose and death, if medicines not taken as prescribed, were also discussed. If the patient develops new symptoms or if the symptoms worsen, the patient should call the office. While transcribing every attempt was made to maintain the accuracy of the note in terms of it's contents,there may have been some errors made inadvertently. Thank you for allowing me to participate in the care of this patient.     Chirag Trevino MD.    Cc: Aminta Gale MD

## 2020-07-24 ENCOUNTER — TELEPHONE (OUTPATIENT)
Dept: PAIN MANAGEMENT | Age: 60
End: 2020-07-24

## 2020-07-24 NOTE — TELEPHONE ENCOUNTER
Patient states she did not get a call for her appt today and needs someone to call her. Patient was informed everyone has gone for the day and will return her call on Monday.  Pl's advise

## 2020-08-06 NOTE — TELEPHONE ENCOUNTER
I need the patient to fill out a representative form that is on the [facility] letters from Cee Adams. So I can send second level APPEAL.

## 2020-08-11 ENCOUNTER — VIRTUAL VISIT (OUTPATIENT)
Dept: PAIN MANAGEMENT | Age: 60
End: 2020-08-11
Payer: COMMERCIAL

## 2020-08-11 PROCEDURE — 99213 OFFICE O/P EST LOW 20 MIN: CPT | Performed by: INTERNAL MEDICINE

## 2020-08-11 RX ORDER — QUETIAPINE FUMARATE 25 MG/1
12.5-25 TABLET, FILM COATED ORAL NIGHTLY
Qty: 30 TABLET | Refills: 1 | Status: SHIPPED | OUTPATIENT
Start: 2020-08-11 | End: 2020-12-15 | Stop reason: SDUPTHER

## 2020-08-11 RX ORDER — TRAMADOL HYDROCHLORIDE 50 MG/1
25-50 TABLET ORAL EVERY 6 HOURS PRN
Qty: 50 TABLET | Refills: 0 | Status: SHIPPED | OUTPATIENT
Start: 2020-08-11 | End: 2020-09-08 | Stop reason: SDUPTHER

## 2020-08-11 RX ORDER — NICOTINE 21 MG/24HR
1 PATCH, TRANSDERMAL 24 HOURS TRANSDERMAL EVERY 24 HOURS
Qty: 30 PATCH | Refills: 0 | Status: SHIPPED | OUTPATIENT
Start: 2020-08-11 | End: 2020-09-08 | Stop reason: SDUPTHER

## 2020-08-11 NOTE — PROGRESS NOTES
TELE HEALTH VISIT (AUDIO-VISUAL)    Pursuant to the emergency declaration under the [facility], Count includes the [facility] waiver authority and the [employer] and [employer] Act, this Virtual  Visit was conducted, with patient's/legal guardian's consent, to reduce the patient's risk of exposure to COVID-19 and provide continuity of care for an established patient. Service is  provided through a video synchronous discussion virtually to substitute for in-person clinic visit. Due to this being a TeleHealth encounter (During OJOVV-41 public health emergency), evaluation of the following organ systems was limited: Vitals/Constitutional/EENT/Resp/CV/GI//MS/Neuro/Skin/Lwgn-Aozq-Jmx. Dorrine Dance  1960  <[id number]>    Ms. Kenton Cho is being seen virtually for a follow up visit using one of the following techniques  Google Duo  Informed verbal consent to the virtual visit was obtained from Ms. Norris. Risks associated with HIPPA compliance with the virtual visit was explained to the patient. Ms. Kenton Cho is at her residence and Dr. Virginia Hemphill is in his office. HISTORY OF PRESENT ILLNESS:  Ms. Kenton Cho is a 64 y.o. female  being assessed for a follow up visit for pain management for evaluation of ongoing care regarding her symptoms and monitoring of compliance with long term use high risk medications. She has a diagnosis of   1. Chronic pain syndrome    2. Fibromyalgia    3. DDD (degenerative disc disease), lumbar    4. DDD (degenerative disc disease), cervical    .      She complains of pain in the lower back  with radiation to the buttocks, hips Bilateral and upper leg Bilateral . She rates the pain 6/10 and describes it as sharp, aching. Current treatment regimen has helped relieve about 0% of the pain. She denies any side effects from the current pain regimen.  Patient reports that since the last follow up visit the physical functioning is unchanged, mood isAppropriate and affect is Neutral/Euthymic(normal). Musculoskeletal / Extremities: Range of motion is normal. Gait is normal, assistive devices use: none. IMPRESSION:   1. Chronic pain syndrome    2. Fibromyalgia    3. DDD (degenerative disc disease), lumbar    4. DDD (degenerative disc disease), cervical        PLAN:  Informed verbal consent regarding treatment was obtained  -continue with the current treatment regimen   -Advised patient to quit smoking for  health related concerns and to improve the treatment outcomes. Education was given on quitting smoking and the use of different modalities including medications, hypnotherapy, counselling  and biofeedback. These were discussed with patient.   -wants to try 21 mg patch of Nicoderm  -start Ultram 50 1/2 tablet 3-4 per day  -She was advised to increase fluids ( 5-7  glasses of fluid daily), limit caffeine, avoid cheese products, increase dietary fiber, increase activity and exercise as tolerated and relax regularly and enjoy meals      Current Outpatient Medications   Medication Sig Dispense Refill    QUEtiapine (SEROQUEL) 25 MG tablet Take 0.5-1 tablets by mouth nightly 30 tablet 0    gabapentin (NEURONTIN) 600 MG tablet Take 1 tablet by mouth 2 times daily for 30 days. 60 tablet 3     No current facility-administered medications for this visit. I will continue her current medication regimen  which is part of the above treatment schedule. It has been helping with Ms. Norris's chronic  medical problems which for this visit include:   Diagnoses of Chronic pain syndrome, Fibromyalgia, DDD (degenerative disc disease), lumbar, and DDD (degenerative disc disease), cervical were pertinent to this visit. Risks and benefits of the medications and other alternative treatments  including no treatment were discussed with the patient. The common side effects of these medications were also explained to the patient. Informed verbal consent was obtained. Goals of current treatment regimen include improvement in pain, restoration of functioning- with focus on improvement in physical performance, general activity, work or disability,emotional distress, health care utilization and  decreased medication consumption. Will continue to monitor progress towards achieving/maintaining therapeutic goals with special emphasis on  1. Improvement in perceived interfernce  of pain with ADL's. Ability to do home exercises independently. Ability to do household chores indoor and/or outdoor work and social and leisure activities. Improve psychosocial and physical functioning. - she is showing progression towards this treatment goal with the current regimen. She was advised against drinking alcohol with the narcotic pain medicines, advised against driving or handling machinery while adjusting the dose of medicines or if having cognitive  issues related to the current medications. Risk of overdose and death, if medicines not taken as prescribed, were also discussed. If the patient develops new symptoms or if the symptoms worsen, the patient should call the office. While transcribing every attempt was made to maintain the accuracy of the note in terms of it's contents,there may have been some errors made inadvertently. Thank you for allowing me to participate in the care of this patient.     Jenna Nyhan, MD.    Cc: Rodrigo Santos MD

## 2020-09-08 ENCOUNTER — VIRTUAL VISIT (OUTPATIENT)
Dept: PAIN MANAGEMENT | Age: 60
End: 2020-09-08
Payer: COMMERCIAL

## 2020-09-08 PROCEDURE — 99213 OFFICE O/P EST LOW 20 MIN: CPT | Performed by: INTERNAL MEDICINE

## 2020-09-08 RX ORDER — NICOTINE 21 MG/24HR
1 PATCH, TRANSDERMAL 24 HOURS TRANSDERMAL EVERY 24 HOURS
Qty: 30 PATCH | Refills: 0 | Status: SHIPPED | OUTPATIENT
Start: 2020-09-08 | End: 2020-12-15 | Stop reason: SDUPTHER

## 2020-09-08 RX ORDER — GABAPENTIN 600 MG/1
600 TABLET ORAL 2 TIMES DAILY
Qty: 60 TABLET | Refills: 3 | Status: SHIPPED | OUTPATIENT
Start: 2020-09-08 | End: 2020-09-22 | Stop reason: ALTCHOICE

## 2020-09-08 RX ORDER — TRAMADOL HYDROCHLORIDE 50 MG/1
25-50 TABLET ORAL EVERY 6 HOURS PRN
Qty: 50 TABLET | Refills: 0 | Status: SHIPPED | OUTPATIENT
Start: 2020-09-08 | End: 2020-11-03 | Stop reason: SDUPTHER

## 2020-09-08 NOTE — PROGRESS NOTES
TELE HEALTH VISIT (AUDIO-VISUAL)    Pursuant to the emergency declaration under the [facility], [facility] waiver authority and the [employer] and [employer] Act, this Virtual  Visit was conducted, with patient's/legal guardian's consent, to reduce the patient's risk of exposure to COVID-19 and provide continuity of care for an established patient. Service is  provided through a video synchronous discussion virtually to substitute for in-person clinic visit. Due to this being a TeleHealth encounter (During SMVBL-14 public health emergency), evaluation of the following organ systems was limited: Vitals/Constitutional/EENT/Resp/CV/GI//MS/Neuro/Skin/Rdjg-Lfsq-Asu. Kary Desouza  1960  <[id number]>    Ms. Danielle Francisco is being seen virtually for a follow up visit using one of the following techniques  google duo   Informed verbal consent to the virtual visit was obtained from Ms. Norris. Risks associated with HIPPA compliance with the virtual visit was explained to the patient. Ms. Danielle Francisco is at her residence and Dr. Yenny Murry is in his office. HISTORY OF PRESENT ILLNESS:  Ms. Danielle Francisco is a 64 y.o. female  being assessed for a follow up visit for pain management for evaluation of ongoing care regarding her symptoms and monitoring of compliance with long term use high risk medications. She has a diagnosis of   1. Chronic pain syndrome    2. Fibromyalgia    3. DDD (degenerative disc disease), lumbar    4. DDD (degenerative disc disease), cervical    5. Lateral epicondylitis of both elbows    . She complains of pain in the Low back  with radiation to the buttocks and hips Left . She rates the pain 5/10 and describes it as sharp. Current treatment regimen has helped relieve about 10% of the pain. She denies any side effects from the current pain regimen.  Patient reports that since the last follow up visit the physical functioning is worse, family/social relationships are unchanged, mood is unchanged sleep patterns are worse, and that the overall functioning is unchanged. Patient denies misusing/abusing her narcotic pain medications or using any illegal drugs. There are No indicators for possible drug abuse, addiction or diversion problems. Patient states she  has been doing fair. She complains her back hurts the most along with her left groin. She say her neck has been hurting also. She mentions she was on vacation. She reports she is using Neurontin 600 mg BID along with Ultram 1 per day as needed. She states she is working full time. ALLERGIES: Patients list of allergies were reviewed     MEDICATIONS: Ms. Paul Bazan list of medications were reviewed. Her current medications are   Outpatient Medications Prior to Visit   Medication Sig Dispense Refill    QUEtiapine (SEROQUEL) 25 MG tablet Take 0.5-1 tablets by mouth nightly 30 tablet 1    traMADol (ULTRAM) 50 MG tablet Take 0.5-1 tablets by mouth every 6 hours as needed for Pain (max 1-2 per day) for up to 28 days. 50 tablet 0    nicotine (NICODERM CQ) 21 MG/24HR Place 1 patch onto the skin every 24 hours 30 patch 0    gabapentin (NEURONTIN) 600 MG tablet Take 1 tablet by mouth 2 times daily for 30 days. 60 tablet 3     No facility-administered medications prior to visit. SOCIAL/FAMILY/PAST MEDICAL HISTORY: Ms. Alina Early, family and past medical history was reviewed. REVIEW OF SYSTEMS:    Respiratory: Negative for apnea, chest tightness and shortness of breath or change in baseline breathing. Gastrointestinal: Negative for nausea, vomiting, abdominal pain, diarrhea, constipation, blood in stool and abdominal distention. PHYSICAL EXAM:   Nursing note and vitals per patient reviewed. There were no vitals taken for this visit. Constitutional: She appears well-developed and well-nourished. No acute distress. No respiratory distress. Skin: Skin appears to be warm and dry. No rashes or any other marks noted. She is not diaphoretic. Respiratory/Pulmonary: NO conversational dyspnea, no accessory muscle use, no coughing during exam. She does not appear to be in labored breathing. Neurological/Psychiatric:She is alert and oriented to person, place, and time. Coordination is  normal.  Her mood isAppropriate and affect is Neutral/Euthymic(normal). Musculoskeletal / Extremities: Range of motion is normal. Gait is normal, assistive devices use: none. IMPRESSION:   1. Chronic pain syndrome    2. Fibromyalgia    3. DDD (degenerative disc disease), lumbar    4. DDD (degenerative disc disease), cervical    5. Lateral epicondylitis of both elbows        PLAN:  Informed verbal consent regarding treatment was obtained  -[place] record was obtained and reviewed  for the last one year and no indicators of drug misuse  were found. Any other controlled substance prescriptions  seen on the record have been accounted for, I am aware of the patient receiving these medications. Randi Teresa [place] record will be rechecked as part of office protocol.    -Increase Neurontin to 600 mg TID   -Continue with Ultram 1 per day as needed, has 30 pills left Continue with ROM stretching exercises as advised    Current Outpatient Medications   Medication Sig Dispense Refill    QUEtiapine (SEROQUEL) 25 MG tablet Take 0.5-1 tablets by mouth nightly 30 tablet 1    traMADol (ULTRAM) 50 MG tablet Take 0.5-1 tablets by mouth every 6 hours as needed for Pain (max 1-2 per day) for up to 28 days. 50 tablet 0    nicotine (NICODERM CQ) 21 MG/24HR Place 1 patch onto the skin every 24 hours 30 patch 0    gabapentin (NEURONTIN) 600 MG tablet Take 1 tablet by mouth 2 times daily for 30 days. 60 tablet 3     No current facility-administered medications for this visit. I will continue her current medication regimen  which is part of the above treatment schedule. It has been helping with Ms. Norris's chronic  medical problems which

## 2020-09-15 ENCOUNTER — TELEPHONE (OUTPATIENT)
Dept: PAIN MANAGEMENT | Age: 60
End: 2020-09-15

## 2020-09-15 NOTE — TELEPHONE ENCOUNTER
PATIENT HAD VV W/RSM ON 9/8 AND WAS INFORMED TO INCREASE HER gabapentin (NEURONTIN) 600 MG tablet FROM BID TO TID. PATIENTS RX WAS WRITTEN FOR 60 TABLETS INSTEAD OF #90 PL'S SEND NEW RX FOR gabapentin (NEURONTIN) 600 MG tablet # 80 TABLETS TO [address] [place] [address].  PL'S ADVISE

## 2020-09-22 RX ORDER — GABAPENTIN 600 MG/1
600 TABLET ORAL 3 TIMES DAILY
Qty: 90 TABLET | Refills: 2 | OUTPATIENT
Start: 2020-09-22 | End: 2021-01-27 | Stop reason: SDUPTHER

## 2020-09-23 NOTE — TELEPHONE ENCOUNTER
Called patient to advise her that I called and had Rx fixed at the pharmacy. Patient voiced her understanding.

## 2020-11-03 ENCOUNTER — TELEPHONE (OUTPATIENT)
Dept: PAIN MANAGEMENT | Age: 60
End: 2020-11-03

## 2020-11-03 ENCOUNTER — VIRTUAL VISIT (OUTPATIENT)
Dept: PAIN MANAGEMENT | Age: 60
End: 2020-11-03
Payer: COMMERCIAL

## 2020-11-03 PROCEDURE — 99213 OFFICE O/P EST LOW 20 MIN: CPT | Performed by: INTERNAL MEDICINE

## 2020-11-03 RX ORDER — TRAMADOL HYDROCHLORIDE 50 MG/1
25-50 TABLET ORAL EVERY 6 HOURS PRN
Qty: 30 TABLET | Refills: 0 | Status: SHIPPED | OUTPATIENT
Start: 2020-11-03 | End: 2020-12-15 | Stop reason: SDUPTHER

## 2020-11-03 NOTE — PROGRESS NOTES
TELE HEALTH VISIT (AUDIO-VISUAL)    Pursuant to the emergency declaration under the [facility], [facility] waiver authority and the [employer] Act, this Virtual  Visit was conducted, with patient's/legal guardian's consent, to reduce the patient's risk of exposure to COVID-19 and provide continuity of care for an established patient. Service is  provided through a video synchronous discussion virtually to substitute for in-person clinic visit. Due to this being a TeleHealth encounter (During BJJPA-45 public health emergency), evaluation of the following organ systems was limited: Vitals/Constitutional/EENT/Resp/CV/GI//MS/Neuro/Skin/Atok-Bkwg-Msy. Nav Corona  1960  <[id number]>    Ms. Lefty Gambino is being seen virtually for a follow up visit using one of the following techniques  Google Duo  Informed verbal consent to the virtual visit was obtained from Ms. Norris. Risks associated with HIPPA compliance with the virtual visit was explained to the patient. Ms. Lefty Gambino is at her residence and Dr. Haylee Daniels is in his office. HISTORY OF PRESENT ILLNESS:  Ms. Lefty Gambino is a 64 y.o. female  being assessed for a follow up visit for pain management for evaluation of ongoing care regarding her symptoms and monitoring of compliance with long term use high risk medications. She has a diagnosis of   1. Chronic pain syndrome    2. Fibromyalgia    3. DDD (degenerative disc disease), lumbar    4. DDD (degenerative disc disease), cervical    5. Lateral epicondylitis of both elbows    . She complains of pain in the lower back  with radiation to the shoulders Bilateral . She rates the pain 5/10 and describes it as aching. Current treatment regimen has helped relieve about 0% of the pain. She denies any side effects from the current pain regimen.  Patient reports that since the last follow up visit the physical functioning is unchanged, family/social relationships are unchanged, mood is unchanged sleep patterns are unchanged, and that the overall functioning is unchanged. Patient denies misusing/abusing her narcotic pain medications or using any illegal drugs. There are No indicators for possible drug abuse, addiction or diversion problems. Ms. Diana Rogers states she has been doing fair, pain has been baseline. Patient reports her back hurts worse than her neck. She mentions she is using Neurontin along with Seroquel. She says she is on Ultram PRN only, she maybe uses 1-2 per day, she has around 25 pills left. Patient denies any constipation symptoms. Patient states her sleep is fair. Has fairly normal sleep latency. Averages about 4-6 hours of sleep a night. Denies any signs of sleep apnea. Feels somewhat rested in the morning. ALLERGIES: Patients list of allergies were reviewed     MEDICATIONS: Ms. Diana Rogers list of medications were reviewed. Her current medications are   Outpatient Medications Prior to Visit   Medication Sig Dispense Refill    nicotine (NICODERM CQ) 21 MG/24HR Place 1 patch onto the skin every 24 hours 30 patch 0    QUEtiapine (SEROQUEL) 25 MG tablet Take 0.5-1 tablets by mouth nightly 30 tablet 1    gabapentin (NEURONTIN) 600 MG tablet Take 1 tablet by mouth 3 times daily for 30 days. 90 tablet 2     No facility-administered medications prior to visit. SOCIAL/FAMILY/PAST MEDICAL HISTORY: Ms. Sage Baldwin, family and past medical history was reviewed. REVIEW OF SYSTEMS:    Respiratory: Negative for apnea, chest tightness and shortness of breath or change in baseline breathing. Gastrointestinal: Negative for nausea, vomiting, abdominal pain, diarrhea, constipation, blood in stool and abdominal distention. PHYSICAL EXAM:   Nursing note and vitals per patient reviewed. There were no vitals taken for this visit. Constitutional: She appears well-developed and well-nourished. No acute distress.  No respiratory distress. Skin: Skin appears to be warm and dry. No rashes or any other marks noted. She is not diaphoretic. Respiratory/Pulmonary: NO conversational dyspnea, no accessory muscle use, no coughing during exam. She does not appear to be in labored breathing. Neurological/Psychiatric:She is alert and oriented to person, place, and time. Coordination is  normal.  Her mood isAppropriate and affect is Flat/blunted and Anxious. Musculoskeletal / Extremities: Range of motion is normal. Gait is normal, assistive devices use: none. IMPRESSION:   1. Chronic pain syndrome    2. Fibromyalgia    3. DDD (degenerative disc disease), lumbar    4. DDD (degenerative disc disease), cervical    5. Lateral epicondylitis of both elbows        PLAN:  Informed verbal consent regarding treatment was obtained  -[place]AR[place] record was obtained and reviewed  for the last one year and no indicators of drug misuse  were found. Any other controlled substance prescriptions  seen on the record have been accounted for, I am aware of the patient receiving these medications. Blake Yrn [place] record will be rechecked as part of office protocol.    -continue with current opioid regimen Ultram PRN  -Adv Biofeedback, relaxation and meditation techniques. Referral to psychologist for CBT was also discussed with patient   -She was advised to increase fluids ( 5-7  glasses of fluid daily), limit caffeine, avoid cheese products, increase dietary fiber, increase activity and exercise as tolerated and relax regularly and enjoy meals   -walking/stretching exercises as advised       Current Outpatient Medications   Medication Sig Dispense Refill    nicotine (NICODERM CQ) 21 MG/24HR Place 1 patch onto the skin every 24 hours 30 patch 0    QUEtiapine (SEROQUEL) 25 MG tablet Take 0.5-1 tablets by mouth nightly 30 tablet 1    gabapentin (NEURONTIN) 600 MG tablet Take 1 tablet by mouth 3 times daily for 30 days.  90 tablet 2     No current facility-administered medications for this visit. I will continue her current medication regimen  which is part of the above treatment schedule. It has been helping with Ms. Norris's chronic  medical problems which for this visit include:   Diagnoses of Chronic pain syndrome, Fibromyalgia, DDD (degenerative disc disease), lumbar, DDD (degenerative disc disease), cervical, and Lateral epicondylitis of both elbows were pertinent to this visit. Risks and benefits of the medications and other alternative treatments  including no treatment were discussed with the patient. The common side effects of these medications were also explained to the patient. Informed verbal consent was obtained. Goals of current treatment regimen include improvement in pain, restoration of functioning- with focus on improvement in physical performance, general activity, work or disability,emotional distress, health care utilization and  decreased medication consumption. Will continue to monitor progress towards achieving/maintaining therapeutic goals with special emphasis on  1. Improvement in perceived interfernce  of pain with ADL's. Ability to do home exercises independently. Ability to do household chores indoor and/or outdoor work and social and leisure activities. Improve psychosocial and physical functioning. - she is showing progression towards this treatment goal with the current regimen. She was advised against drinking alcohol with the narcotic pain medicines, advised against driving or handling machinery while adjusting the dose of medicines or if having cognitive  issues related to the current medications. Risk of overdose and death, if medicines not taken as prescribed, were also discussed. If the patient develops new symptoms or if the symptoms worsen, the patient should call the office.     While transcribing every attempt was made to maintain the accuracy of the note in terms of it's contents,there may have been some errors made inadvertently. Thank you for allowing me to participate in the care of this patient.     Stephany Vasques MD.    Cc: Neptali Pearson MD

## 2020-11-03 NOTE — TELEPHONE ENCOUNTER
Submitted PA for TRAMADOL  Via CMM Key: [id number] STATUS: APPROVED. The patient was notified by phone.

## 2020-12-15 ENCOUNTER — VIRTUAL VISIT (OUTPATIENT)
Dept: PAIN MANAGEMENT | Age: 60
End: 2020-12-15
Payer: COMMERCIAL

## 2020-12-15 PROCEDURE — 99213 OFFICE O/P EST LOW 20 MIN: CPT | Performed by: INTERNAL MEDICINE

## 2020-12-15 RX ORDER — NICOTINE 21 MG/24HR
1 PATCH, TRANSDERMAL 24 HOURS TRANSDERMAL EVERY 24 HOURS
Qty: 30 PATCH | Refills: 1 | Status: SHIPPED | OUTPATIENT
Start: 2020-12-15 | End: 2021-01-27 | Stop reason: SDUPTHER

## 2020-12-15 RX ORDER — QUETIAPINE FUMARATE 25 MG/1
12.5-25 TABLET, FILM COATED ORAL NIGHTLY
Qty: 30 TABLET | Refills: 1 | Status: SHIPPED | OUTPATIENT
Start: 2020-12-15 | End: 2021-01-27 | Stop reason: SDUPTHER

## 2020-12-15 RX ORDER — TRAMADOL HYDROCHLORIDE 50 MG/1
25 TABLET ORAL EVERY 6 HOURS PRN
Qty: 30 TABLET | Refills: 0 | Status: SHIPPED | OUTPATIENT
Start: 2020-12-15 | End: 2021-01-19

## 2020-12-15 NOTE — PROGRESS NOTES
TELE HEALTH VISIT (AUDIO-VISUAL)    Pursuant to the emergency declaration under the [facility], Critical access hospital waiver authority and the [employer] Act, this Virtual  Visit was conducted, with patient's/legal guardian's consent, to reduce the patient's risk of exposure to COVID-19 and provide continuity of care for an established patient. Service is  provided through a video synchronous discussion virtually to substitute for in-person clinic visit. Due to this being a TeleHealth encounter (During HKDRB-41 public health emergency), evaluation of the following organ systems was limited: Vitals/Constitutional/EENT/Resp/CV/GI//MS/Neuro/Skin/Vkhh-Noqg-Rkn. Ernesto Sky  1960  <[id number]>    Ms. Yuliana Bauman is being seen virtually for a follow up visit using one of the following techniques  Doxy. me  Informed verbal consent to the virtual visit was obtained from Ms. Norris. Risks associated with HIPPA compliance with the virtual visit was explained to the patient. Ms. Yuliana Bauman is at her residence and Dr. Tara Thomason is in his office. HISTORY OF PRESENT ILLNESS:  Ms. Yuliana Bauman is a 64 y.o. female  being assessed for a follow up visit for pain management for evaluation of ongoing care regarding her symptoms and monitoring of compliance with long term use high risk medications. She has a diagnosis of   1. Chronic pain syndrome    2. Fibromyalgia    3. DDD (degenerative disc disease), lumbar    4. DDD (degenerative disc disease), cervical    5. Lateral epicondylitis of both elbows    . She complains of pain in the neck, upper back  with radiation to the shoulders Bilateral . She rates the pain 7/10 and describes it as burning. Current treatment regimen has helped relieve about 20% of the pain. She denies any side effects from the current pain regimen. Patient reports that since the last follow up visit the physical functioning is unchanged, family/social relationships are unchanged, mood is unchanged sleep patterns are unchanged, and that the overall functioning is unchanged. Patient denies misusing/abusing her narcotic pain medications or using any illegal drugs. There are No indicators for possible drug abuse, addiction or diversion problems. Patient states she has been doing fair. She complains her left shoulder has been hurting, thinks its from the arthritis. She says she is working part time 33 hrs per week. She mentions she is using Seroquel along with Neurontin and Ultram 1-2 per day as needed. She reports she took an old flexeril and its helping some. ALLERGIES: Patients list of allergies were reviewed     MEDICATIONS: Ms. Rolanda Champagne list of medications were reviewed. Her current medications are   Outpatient Medications Prior to Visit   Medication Sig Dispense Refill    nicotine (NICODERM CQ) 21 MG/24HR Place 1 patch onto the skin every 24 hours 30 patch 0    QUEtiapine (SEROQUEL) 25 MG tablet Take 0.5-1 tablets by mouth nightly 30 tablet 1    gabapentin (NEURONTIN) 600 MG tablet Take 1 tablet by mouth 3 times daily for 30 days. 90 tablet 2     No facility-administered medications prior to visit. SOCIAL/FAMILY/PAST MEDICAL HISTORY: Ms. Rosa Armenta, family and past medical history was reviewed. REVIEW OF SYSTEMS:    Respiratory: Negative for apnea, chest tightness and shortness of breath or change in baseline breathing. Gastrointestinal: Negative for nausea, vomiting, abdominal pain, diarrhea, constipation, blood in stool and abdominal distention. PHYSICAL EXAM:   Nursing note and vitals per patient reviewed. There were no vitals taken for this visit. Constitutional: She appears well-developed and well-nourished. No acute distress. No respiratory distress. Skin: Skin appears to be warm and dry. No rashes or any other marks noted. She is not diaphoretic. Respiratory/Pulmonary: NO conversational dyspnea, no accessory muscle use, no coughing during exam. She does not appear to be in labored breathing. Neurological/Psychiatric:She is alert and oriented to person, place, and time. Coordination is  normal.  Her mood isAppropriate and affect is Anxious. Musculoskeletal / Extremities: Range of motion is normal. Gait is normal, assistive devices use: none. IMPRESSION:   1. Chronic pain syndrome    2. Fibromyalgia    3. DDD (degenerative disc disease), lumbar    4. DDD (degenerative disc disease), cervical    5. Lateral epicondylitis of both elbows        PLAN:  Informed verbal consent regarding treatment was obtained  -. [place]AR[place] record was obtained and reviewed  for the last one year and no indicators of drug misuse  were found. Any other controlled substance prescriptions  seen on the record have been accounts   -Continue with Ultram 1/2  1-2 per day   -Adv Biofeedback, relaxation and meditation techniques. Referral to psychologist for CBT was also discussed with patient   -She was advised to increase fluids ( 5-7  glasses of fluid daily), limit caffeine, avoid cheese products, increase dietary fiber, increase activity and exercise as tolerated and relax regularly and enjoy meals   -Start Robaxin 500 mg BID as needed for neck spasms.   Current Outpatient Medications   Medication Sig Dispense Refill    nicotine (NICODERM CQ) 21 MG/24HR Place 1 patch onto the skin every 24 hours 30 patch 0    QUEtiapine (SEROQUEL) 25 MG tablet Take 0.5-1 tablets by mouth nightly 30 tablet 1  gabapentin (NEURONTIN) 600 MG tablet Take 1 tablet by mouth 3 times daily for 30 days. 90 tablet 2     No current facility-administered medications for this visit. I will continue her current medication regimen  which is part of the above treatment schedule. It has been helping with Ms. Norris's chronic  medical problems which for this visit include:   Diagnoses of Chronic pain syndrome, Fibromyalgia, DDD (degenerative disc disease), lumbar, DDD (degenerative disc disease), cervical, and Lateral epicondylitis of both elbows were pertinent to this visit. Risks and benefits of the medications and other alternative treatments  including no treatment were discussed with the patient. The common side effects of these medications were also explained to the patient. Informed verbal consent was obtained. Goals of current treatment regimen include improvement in pain, restoration of functioning- with focus on improvement in physical performance, general activity, work or disability,emotional distress, health care utilization and  decreased medication consumption. Will continue to monitor progress towards achieving/maintaining therapeutic goals with special emphasis on  1. Improvement in perceived interfernce  of pain with ADL's. Ability to do home exercises independently. Ability to do household chores indoor and/or outdoor work and social and leisure activities. Improve psychosocial and physical functioning. - she is showing progression towards this treatment goal with the current regimen. She was advised against drinking alcohol with the narcotic pain medicines, advised against driving or handling machinery while adjusting the dose of medicines or if having cognitive  issues related to the current medications. Risk of overdose and death, if medicines not taken as prescribed, were also discussed. If the patient develops new symptoms or if the symptoms worsen, the patient should call the office. While transcribing every attempt was made to maintain the accuracy of the note in terms of it's contents,there may have been some errors made inadvertently. Thank you for allowing me to participate in the care of this patient.     Zenaida Trent MD.    Cc: Ene Leblanc MD

## 2021-01-27 ENCOUNTER — VIRTUAL VISIT (OUTPATIENT)
Dept: FAMILY MEDICINE CLINIC | Age: 61
End: 2021-01-27
Payer: COMMERCIAL

## 2021-01-27 DIAGNOSIS — M51.36 DDD (DEGENERATIVE DISC DISEASE), LUMBAR: ICD-10-CM

## 2021-01-27 DIAGNOSIS — G89.4 CHRONIC PAIN SYNDROME: ICD-10-CM

## 2021-01-27 DIAGNOSIS — M25.512 CHRONIC LEFT SHOULDER PAIN: ICD-10-CM

## 2021-01-27 DIAGNOSIS — F33.0 MILD EPISODE OF RECURRENT MAJOR DEPRESSIVE DISORDER (HCC): ICD-10-CM

## 2021-01-27 DIAGNOSIS — M79.7 FIBROMYALGIA: Primary | ICD-10-CM

## 2021-01-27 DIAGNOSIS — G89.29 CHRONIC LEFT SHOULDER PAIN: ICD-10-CM

## 2021-01-27 DIAGNOSIS — F17.200 TOBACCO DEPENDENCE: ICD-10-CM

## 2021-01-27 DIAGNOSIS — M50.30 DDD (DEGENERATIVE DISC DISEASE), CERVICAL: ICD-10-CM

## 2021-01-27 PROCEDURE — 99214 OFFICE O/P EST MOD 30 MIN: CPT | Performed by: FAMILY MEDICINE

## 2021-01-27 RX ORDER — NICOTINE 21 MG/24HR
1 PATCH, TRANSDERMAL 24 HOURS TRANSDERMAL EVERY 24 HOURS
Qty: 30 PATCH | Refills: 1 | Status: SHIPPED | OUTPATIENT
Start: 2021-01-27 | End: 2021-04-06 | Stop reason: SDUPTHER

## 2021-01-27 RX ORDER — GABAPENTIN 600 MG/1
600 TABLET ORAL 3 TIMES DAILY
Qty: 90 TABLET | Refills: 2 | Status: SHIPPED | OUTPATIENT
Start: 2021-01-27 | End: 2021-04-06 | Stop reason: SDUPTHER

## 2021-01-27 RX ORDER — QUETIAPINE FUMARATE 25 MG/1
12.5-25 TABLET, FILM COATED ORAL NIGHTLY
Qty: 30 TABLET | Refills: 1 | Status: SHIPPED | OUTPATIENT
Start: 2021-01-27 | End: 2021-04-06 | Stop reason: SDUPTHER

## 2021-01-27 NOTE — PROGRESS NOTES
2021    TELEHEALTH EVALUATION -- Audio/Visual (During ZJVGY-44 public health emergency)    HPI:    Lauryn Reaves (:  1960) has requested an audio/video evaluation for the following concern(s):    Chief Complaint   Patient presents with    Other     f/u to get refills, does not see pain doc any longer      She is just 'working and stuff'    Hx MDD. She feels it is always worse in the winter time, but 'i'm alright.'  No SI  Not currently on meds for mood. Took antidepressants for a decade and does not wish to restart. Hx low vit D.  Level was adequate 3/20: 38. Not currently on replacement. Fibromyalgia: gabapentin has been very useful for allowing her to continue working. If she misses doses, her symptoms do flare and increases pain in arms, neck. Some days are better than others. Worse with cold/wet weather. Has been working with DR Nadya Mcmahan, but wishes not to continue there. Wishes me to take over gabapentin management. Currently 600 mg TID. OARRS report reviewed; is consistent with prescribed use and free of suspicious activity. .    Uses low dose seroquel for sleep most nights. Takes half dose 12.5 mg. She is using nicotine patch to help her quit smoking. On 21 mg dose. Down to 2-3 a day. She c/o shoulder pain also (mostly left shoulder in past year). With certain movements like across her body. Not with overhead motion. Does not recall an injury. Gradually worsening past few years. Has not seen ortho for this yet. No prior x rays.       Review of Systems As above     Patient Active Problem List   Diagnosis    Hot flashes    Vitamin D deficiency- 15 on 3/14/14    Fibromyalgia    Insomnia    Major depression (HCC)    Tobacco dependence    Ulnar neuropathy at elbow    Lateral epicondylitis of both elbows    Hyperglycemia-  (3/18)    Chronic anxiety    DDD (degenerative disc disease), cervical        Prior to Visit Medications - gabapentin (NEURONTIN) 600 MG tablet; Take 1 tablet by mouth 3 times daily for 90 days. Dispense: 90 tablet; Refill: 2    2. Mild episode of recurrent major depressive disorder ([address]  - she does not feel that Rx is needed currently. 3. Tobacco dependence  - discussed continuing 21 mg dose of nicoderm until she quits fully. Then she can start weaning dose of patch gradually. - nicotine (NICODERM CQ) 21 MG/24HR; Place 1 patch onto the skin every 24 hours  Dispense: 30 patch; Refill: 1    4. Chronic left shoulder pain  - advised ortho evaluation for this. She will let me know if and when she is ready to do this. Return in about 3 months (around 4/27/2021) for fibromyalgia. Marvin Phalen is a 64 y.o. female being evaluated by a Virtual Visit (video visit) encounter to address concerns as mentioned above. A caregiver was present when appropriate. Due to this being a TeleHealth encounter (During [facility]- public health emergency), evaluation of the following organ systems was limited: Vitals/Constitutional/EENT/Resp/CV/GI//MS/Neuro/Skin/Heme-Lymph-Imm. Pursuant to the emergency declaration under the [address] [place] [address] authority and the [employer] Act, this Virtual Visit was conducted with patient's (and/or legal guardian's) consent, to reduce the patient's risk of exposure to COVID-19 and provide necessary medical care. The patient (and/or legal guardian) has also been advised to contact this office for worsening conditions or problems, and seek emergency medical treatment and/or call 911 if deemed necessary. Patient identification was verified at the start of the visit: Yes    Total time spent on this encounter: 21 or more minutes were spent on the digital evaluation and management of this patient. Services were provided through a video synchronous discussion virtually to substitute for in-person clinic visit. Patient and provider were located at their individual homes. --Alem Winn MD on 1/27/2021 at 8:02 AM    An electronic signature was used to authenticate this note.

## 2021-03-24 ENCOUNTER — TELEPHONE (OUTPATIENT)
Dept: PAIN MANAGEMENT | Age: 61
End: 2021-03-24

## 2021-04-06 ENCOUNTER — TELEPHONE (OUTPATIENT)
Dept: FAMILY MEDICINE CLINIC | Age: 61
End: 2021-04-06

## 2021-04-06 ENCOUNTER — OFFICE VISIT (OUTPATIENT)
Dept: FAMILY MEDICINE CLINIC | Age: 61
End: 2021-04-06
Payer: COMMERCIAL

## 2021-04-06 ENCOUNTER — NURSE TRIAGE (OUTPATIENT)
Dept: OTHER | Facility: CLINIC | Age: 61
End: 2021-04-06

## 2021-04-06 VITALS
WEIGHT: 124 LBS | BODY MASS INDEX: 20.66 KG/M2 | SYSTOLIC BLOOD PRESSURE: 100 MMHG | HEART RATE: 88 BPM | DIASTOLIC BLOOD PRESSURE: 62 MMHG | TEMPERATURE: 97.1 F | OXYGEN SATURATION: 97 % | HEIGHT: 65 IN

## 2021-04-06 DIAGNOSIS — M75.41 SHOULDER IMPINGEMENT SYNDROME, RIGHT: ICD-10-CM

## 2021-04-06 DIAGNOSIS — M54.12 RIGHT CERVICAL RADICULOPATHY: Primary | ICD-10-CM

## 2021-04-06 DIAGNOSIS — M79.7 FIBROMYALGIA: ICD-10-CM

## 2021-04-06 DIAGNOSIS — F17.200 TOBACCO DEPENDENCE: ICD-10-CM

## 2021-04-06 PROCEDURE — 99214 OFFICE O/P EST MOD 30 MIN: CPT | Performed by: FAMILY MEDICINE

## 2021-04-06 RX ORDER — QUETIAPINE FUMARATE 25 MG/1
12.5-25 TABLET, FILM COATED ORAL NIGHTLY
Qty: 30 TABLET | Refills: 1 | Status: SHIPPED | OUTPATIENT
Start: 2021-04-06 | End: 2021-07-14 | Stop reason: SDUPTHER

## 2021-04-06 RX ORDER — NICOTINE 21 MG/24HR
1 PATCH, TRANSDERMAL 24 HOURS TRANSDERMAL EVERY 24 HOURS
Qty: 30 PATCH | Refills: 1 | Status: SHIPPED | OUTPATIENT
Start: 2021-04-06 | End: 2021-08-17 | Stop reason: DRUGHIGH

## 2021-04-06 RX ORDER — GABAPENTIN 600 MG/1
600 TABLET ORAL 3 TIMES DAILY
Qty: 90 TABLET | Refills: 2 | Status: SHIPPED | OUTPATIENT
Start: 2021-04-06 | End: 2021-07-14 | Stop reason: SDUPTHER

## 2021-04-06 RX ORDER — CYCLOBENZAPRINE HCL 10 MG
10 TABLET ORAL 3 TIMES DAILY PRN
Qty: 75 TABLET | Refills: 0 | Status: SHIPPED | OUTPATIENT
Start: 2021-04-06 | End: 2021-04-21

## 2021-04-06 RX ORDER — NAPROXEN 500 MG/1
500 TABLET ORAL 2 TIMES DAILY WITH MEALS
Qty: 60 TABLET | Refills: 0 | Status: SHIPPED | OUTPATIENT
Start: 2021-04-06 | End: 2021-08-17

## 2021-04-06 ASSESSMENT — PATIENT HEALTH QUESTIONNAIRE - PHQ9
4. FEELING TIRED OR HAVING LITTLE ENERGY: 2
5. POOR APPETITE OR OVEREATING: 0
9. THOUGHTS THAT YOU WOULD BE BETTER OFF DEAD, OR OF HURTING YOURSELF: 1
3. TROUBLE FALLING OR STAYING ASLEEP: 2
SUM OF ALL RESPONSES TO PHQ9 QUESTIONS 1 & 2: 4
6. FEELING BAD ABOUT YOURSELF - OR THAT YOU ARE A FAILURE OR HAVE LET YOURSELF OR YOUR FAMILY DOWN: 1
7. TROUBLE CONCENTRATING ON THINGS, SUCH AS READING THE NEWSPAPER OR WATCHING TELEVISION: 1
SUM OF ALL RESPONSES TO PHQ QUESTIONS 1-9: 11

## 2021-04-06 ASSESSMENT — COLUMBIA-SUICIDE SEVERITY RATING SCALE - C-SSRS: 6. HAVE YOU EVER DONE ANYTHING, STARTED TO DO ANYTHING, OR PREPARED TO DO ANYTHING TO END YOUR LIFE?: NO

## 2021-04-06 NOTE — LETTER
[address] [facility] [address] [facility]  Phone: [phone]  Fax: [phone]    Paul Robert MD        April 6, 2021     Patient: Nya Montgomery   YOB: 1960   Date of Visit: 4/6/2021       To Whom It May Concern: It is my medical opinion that Keanu Dean should remain out of work until medically cleared by me or her specialist due to likely cervical nerve root injury. If you have any questions or concerns, please don't hesitate to call.     Sincerely,        Paul Robert MD

## 2021-04-06 NOTE — TELEPHONE ENCOUNTER
Has a nurse triage message today. Having severe pain in both shoulders and neck, want to come in today. Can we add at 4:00?

## 2021-04-06 NOTE — PROGRESS NOTES
2021    Blood pressure 100/62, pulse 88, temperature 97.1 °F (36.2 °C), temperature source Temporal, height 5' 5\" (1.651 m), weight 124 lb (56.2 kg), SpO2 97 %, not currently breastfeeding. Vincent Lorenz (:  1960) is a 64 y.o. female, here for evaluation of the following medical concerns:    Chief Complaint   Patient presents with    Shoulder Pain     both shoulders painful and both arms, for several years, has gotten worse in last few weeks. feels it may be caused from neck     She is here for pain and parestheisas in R neck and arm and hand. Worse with neck motions, bobby looking up. Or reaching out with R arm. Pain radiates to lat r shoulder, forearm, palm and radial half of hand. 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 She has prior CTS and this feels different. She takes gabapentin for fibromyalgia. This feels different from fibro    She has prior neck pain- x rays showed mild arthritic changes. + hand weakness on R. Medicines used; ibuprofen OTC      Patient Active Problem List   Diagnosis    Hot flashes    Vitamin D deficiency- 14 on 3/14/14    Fibromyalgia    Insomnia    Major depression (HCC)    Tobacco dependence    Ulnar neuropathy at elbow    Lateral epicondylitis of both elbows    Hyperglycemia-  (3/18)    Chronic anxiety    DDD (degenerative disc disease), cervical        Body mass index is 20.63 kg/m². Wt Readings from Last 3 Encounters:   21 124 lb (56.2 kg)   20 123 lb (55.8 kg)   20 123 lb (55.8 kg)       BP Readings from Last 3 Encounters:   21 100/62   20 105/64   20 114/68       No Known Allergies    Prior to Visit Medications    Medication Sig Taking?  Authorizing Provider   nicotine (NICODERM CQ) 21 MG/24HR Place 1 patch onto the skin every 24 hours Yes Dandy Cisneros

## 2021-04-08 ENCOUNTER — HOSPITAL ENCOUNTER (OUTPATIENT)
Dept: NEUROLOGY | Age: 61
Discharge: HOME | End: 2021-04-08
Payer: COMMERCIAL

## 2021-04-08 DIAGNOSIS — M54.12 RIGHT CERVICAL RADICULOPATHY: ICD-10-CM

## 2021-04-08 PROCEDURE — 95909 NRV CNDJ TST 5-6 STUDIES: CPT

## 2021-04-08 PROCEDURE — 95886 MUSC TEST DONE W/N TEST COMP: CPT | Performed by: PSYCHIATRY & NEUROLOGY

## 2021-04-08 PROCEDURE — 95909 NRV CNDJ TST 5-6 STUDIES: CPT | Performed by: PSYCHIATRY & NEUROLOGY

## 2021-04-08 PROCEDURE — 95886 MUSC TEST DONE W/N TEST COMP: CPT

## 2021-04-08 NOTE — PROCEDURES
[facility]                     [facility], [address]                             ELECTROMYOGRAM REPORT    PATIENT NAME: Josh De León                    :        1960  MED REC NO:   [id number]                          ROOM:  ACCOUNT NO:   [de-identified]                           ADMIT DATE: 2021  PROVIDER:     Jamar Luther MD    DATE OF EM2021    REASON FOR EMG:  Right arm pain and neck pain, rule out cervical  radiculopathy. SUMMARY:  The right median motor nerve study had a borderline distal  latency. The right median sensory nerve study had a prolonged distal  latency. The right ulnar motor nerve study had a moderately severe  slowing of conduction velocity across the elbow. The right ulnar  sensory nerve study had a slightly prolonged distal latency. The right  radial sensory nerve study was normal.  Needle EMG of several muscles in  the right upper extremity including the cervical paraspinal muscles was  normal.    EMG DIAGNOSES:  1.  Moderately severe right ulnar nerve lesion at the elbow. 2.  Mild right median nerve lesion at the wrist (carpal tunnel  syndrome). 3.  There is no electrophysiological evidence for cervical radiculopathy  in this study.         Kieran Villasenor MD    D: 2021 13:56:07       T: 2021 14:04:37     [email or web address]  Job#: [id number]     Doc#: [id number]    CC:

## 2021-04-13 ENCOUNTER — TELEPHONE (OUTPATIENT)
Dept: FAMILY MEDICINE CLINIC | Age: 61
End: 2021-04-13

## 2021-04-26 ENCOUNTER — TELEPHONE (OUTPATIENT)
Dept: FAMILY MEDICINE CLINIC | Age: 61
End: 2021-04-26

## 2021-04-26 NOTE — TELEPHONE ENCOUNTER
, just to verify, you prefer to fill out the FMLA forms at the apt correct? Or is this something you can do without an apt? Pt does have a fu on 5/10/21 with you if we need to do forms at apt. Thanks.

## 2021-04-26 NOTE — TELEPHONE ENCOUNTER
Pt came in and dropped of FMLA and disability paperwork that needs to be filled out by Dr. Damian Delgadillo  Off since the 4.7.2021  Has an appt on 5.10.2021 with Dr. Damian Delgadillo  Please fill out forms  Once forms are filled out please fax to [phone]  Call pt once forms are filled out  Forms placed in Dr. William Krishna folder

## 2021-04-30 NOTE — TELEPHONE ENCOUNTER
Forms completed. Please add office info, scan to chart then contact [facility] for . Thanks.

## 2021-04-30 NOTE — TELEPHONE ENCOUNTER
Called patient about picking up papers. She wants them faxed.    Called Vivi Ayala form her employer to pily fax number to send her FLMA

## 2021-05-05 NOTE — TELEPHONE ENCOUNTER
Informed Naye Hilliard that employer has never called us back, with a fax number for FLMA. She said Jevon Rosa is out of office till Monday 5/10/21. Naye Hilliard has appt on Monday here, so will  forms and take with her.   Was waiting on fax number to send her FLMA forms, but The disability form was faxed to [employer]

## 2021-05-10 ENCOUNTER — OFFICE VISIT (OUTPATIENT)
Dept: FAMILY MEDICINE CLINIC | Age: 61
End: 2021-05-10
Payer: COMMERCIAL

## 2021-05-10 VITALS
OXYGEN SATURATION: 98 % | BODY MASS INDEX: 20.83 KG/M2 | DIASTOLIC BLOOD PRESSURE: 64 MMHG | HEART RATE: 96 BPM | SYSTOLIC BLOOD PRESSURE: 102 MMHG | HEIGHT: 65 IN | WEIGHT: 125 LBS

## 2021-05-10 DIAGNOSIS — M77.11 LATERAL EPICONDYLITIS OF RIGHT ELBOW: ICD-10-CM

## 2021-05-10 DIAGNOSIS — G89.29 CHRONIC LEFT SHOULDER PAIN: ICD-10-CM

## 2021-05-10 DIAGNOSIS — M25.512 CHRONIC LEFT SHOULDER PAIN: ICD-10-CM

## 2021-05-10 DIAGNOSIS — M77.01 MEDIAL EPICONDYLITIS, RIGHT: ICD-10-CM

## 2021-05-10 DIAGNOSIS — G56.21 ULNAR NEUROPATHY AT ELBOW OF RIGHT UPPER EXTREMITY: Primary | ICD-10-CM

## 2021-05-10 PROCEDURE — 99214 OFFICE O/P EST MOD 30 MIN: CPT | Performed by: FAMILY MEDICINE

## 2021-05-10 NOTE — PROGRESS NOTES
5/10/2021    Blood pressure 102/64, pulse 96, height 5' 5\" (1.651 m), weight 125 lb (56.7 kg), SpO2 98 %, not currently breastfeeding. Jose Antonio Green (:  1960) is a 64 y.o. female, here for evaluation of the following medical concerns:    Chief Complaint   Patient presents with    Follow-up     f/u flma/disability     F/u on ulnar neuropathy on R side, manifesting as pain in her R fingers, hand and forearm, extending to elbow. She also has chronic FM and bilat shoulder pains as well. (PT has also identified left shoulder dysfunction and is working on that- not evaluated here previously). Her R hand feels a bit weak and clumsy. Not as bad with left hand. Had EMG done last month showing:    EMG DIAGNOSES:  1.  Moderately severe right ulnar nerve lesion at the elbow. 2.  Mild right median nerve lesion at the wrist (carpal tunnel  syndrome). 3.  There is no electrophysiological evidence for cervical radiculopathy  in this study. Has been attending PT at [facility] for shoulder pain and this. Has not seen improvement in her pain     She feels all these problems have been present for several years, but did not seek rest or treatment, kept working. She did have ulnar release surgery by DR Luke Mccurdy bilaterally in 2017 with complete resolution of the pain. She has also had carpal tunnel release ~age 29    She is planning to see an ortho at [facility] for further eval of her symptoms - does not have an appt yet. Needs a referral first.    She still smokes 1/4-1/2 ppd    She has not been working as this exacerbates the pain in her R hand and forarm as well as left shoulder. Left shoulder pain:   Present for 3 yrs or more  No specific injury recalled. Pain increases with reaching forward and across her body. Not so much with overhead motions- as long as she reaches forward. Hurts to  up things like milk with L hand, grandson. She is usign gabapentin and naproxen for pain.   On gabapentin for Fibro already. Neither help tremendously. She uses naproxen only sporadically now. She is not on PPI therapy ('it has never bothered me')    Patient Active Problem List   Diagnosis    Hot flashes    Vitamin D deficiency- 14 on 3/14/14    Fibromyalgia    Insomnia    Major depression (HCC)    Tobacco dependence    Ulnar neuropathy at elbow    Lateral epicondylitis of both elbows    Hyperglycemia-  (3/18)    Chronic anxiety    DDD (degenerative disc disease), cervical    Right cervical radiculopathy        Body mass index is 20.8 kg/m². Wt Readings from Last 3 Encounters:   05/10/21 125 lb (56.7 kg)   04/06/21 124 lb (56.2 kg)   04/21/20 123 lb (55.8 kg)       BP Readings from Last 3 Encounters:   05/10/21 102/64   04/06/21 100/62   04/21/20 105/64       No Known Allergies    Prior to Visit Medications    Medication Sig Taking? Authorizing Provider   nicotine (NICODERM CQ) 21 MG/24HR Place 1 patch onto the skin every 24 hours Yes Adriana Fountain MD   QUEtiapine (SEROQUEL) 25 MG tablet Take 0.5-1 tablets by mouth nightly Yes Adriana Fountain MD   naproxen (NAPROSYN) 500 MG tablet Take 1 tablet by mouth 2 times daily (with meals) Yes Adriana Fountain MD   gabapentin (NEURONTIN) 600 MG tablet Take 1 tablet by mouth 3 times daily for 30 days. Adriana Fountain MD        Social History     Tobacco Use    Smoking status: Current Every Day Smoker     Packs/day: 0.10     Years: 30.00     Pack years: 3.00     Types: Cigarettes    Smokeless tobacco: Never Used    Tobacco comment: using nicotine patches to quit- down to 3/day   Substance Use Topics    Alcohol use: Yes     Alcohol/week: 0.0 standard drinks     Comment: rare use    Drug use: No       Review of Systems As above     Physical Exam  Constitutional:       General: She is not in acute distress. Appearance: Normal appearance. She is not ill-appearing.    Pulmonary:      Effort: Pulmonary effort is normal.   Musculoskeletal:      Comments: RUE:  + cubital tinel's  + tender medial/lat epicondyles with palpation and with resisted supination.  5/5    L shoulder:  Reduce ROM due to pain. Pain with resisted biceps flexion and palpation of bicipital groove. Pain with Pratt's, Neer's, Anderson maneuvers. Tender anterior GH sulcus and over AC joint. Supraspinatus resistance is quite good on left, with minimal discomfort. Skin:     General: Skin is warm. Neurological:      General: No focal deficit present. Mental Status: She is alert and oriented to person, place, and time. Mental status is at baseline. Psychiatric:         Mood and Affect: Mood normal.         Behavior: Behavior normal.         Thought Content: Thought content normal.         Judgment: Judgment normal.         ASSESSMENT/PLAN:    1. Ulnar neuropathy at elbow of right upper extremity  - advised ortho eval for this. Agree with continued off work due to severity of pain and chronicity of her condition.  - External Referral To Orthopedic Surgery    2. Medial epicondylitis, right  - discussed that this condition would likely benefit from PT and appears to be contributing to her pain complex. discussed that tobacco use increases severity of infammatory conditions and makes them more difficult to resolve. She is agreeable to try nicotine patch again. - continue nsaid therapy (naproxen)  - External Referral To Orthopedic Surgery    3. Lateral epicondylitis of right elbow  As above   - External Referral To Orthopedic Surgery    4. Chronic left shoulder pain  - discussed that my exam suggests bicipital tendonitis, which usually also involves RTC tendinopathy. Will likely need advanced imaging, but defer to ortho's exam before ordering this. - External Referral To Orthopedic Surgery      Follow up: as needed, based on her plans with ortho    An  electronicsignature was used to authenticate this note.     --Mark Pace MD on

## 2021-05-20 ENCOUNTER — PATIENT MESSAGE (OUTPATIENT)
Dept: FAMILY MEDICINE CLINIC | Age: 61
End: 2021-05-20

## 2021-05-21 NOTE — TELEPHONE ENCOUNTER
From: Lainey Saravia  To: Sherrill Westfall MD  Sent: 5/20/2021 5:24 PM EDT  Subject: Visit Follow-Up Question    Hi, I went to dr Yamini Francisco today and she diagnosed me with scapular dyskinesia. Plan is to continue physical therapy 4-6wks and if not better return to her. Braces on arm for ulnar nerve issues. They are leaving disability and off work to you. I would like to try and rest shoulder and arm. My short term disability should be good til around Aug 10th. Hopefully things will be resolved by than. Let me know what you think.  Thank you

## 2021-05-24 NOTE — TELEPHONE ENCOUNTER
Patient is going to bring in the copies I gave her for her disability. So I can make copies to change the form for her. To be off for 6-8 week, rather than partial disability for 4 weeks. Dr. Stephan Dumont said this was fine and have someone else sign the form.

## 2021-05-26 ENCOUNTER — TELEPHONE (OUTPATIENT)
Dept: FAMILY MEDICINE CLINIC | Age: 61
End: 2021-05-26

## 2021-05-26 NOTE — TELEPHONE ENCOUNTER
Informed her the papter was updated and faxed to [id number]. She asked about a work status report.  Told her this is something she needs to get from her Employer, or have them fax to [employer]

## 2021-06-09 ENCOUNTER — TELEPHONE (OUTPATIENT)
Dept: FAMILY MEDICINE CLINIC | Age: 61
End: 2021-06-09

## 2021-06-09 NOTE — TELEPHONE ENCOUNTER
PT IS CALLING NEEDING SOMETHING REGARDING HOW LONG SHE IS SUPPOSED TO BE OUT OF WORK  OFF WORK ON SHORT TERM DISABILITY  HER WORK JUST NEEDS SOMETHING WITH DATES ON IT TO FAX TO HER WORK    PT IS AWARE THAT [address] [facility] [address] CABRERA ARE BOTH OFF ALL WEEK    FAX NUMBER IS  [phone]

## 2021-06-09 NOTE — TELEPHONE ENCOUNTER
Called pt and explained that she will need to get us form to complete for this  She will have this faxed to us today

## 2021-07-14 ENCOUNTER — VIRTUAL VISIT (OUTPATIENT)
Dept: FAMILY MEDICINE CLINIC | Age: 61
End: 2021-07-14
Payer: COMMERCIAL

## 2021-07-14 DIAGNOSIS — R20.2 NUMBNESS AND TINGLING IN RIGHT HAND: ICD-10-CM

## 2021-07-14 DIAGNOSIS — M75.42 IMPINGEMENT SYNDROME OF LEFT SHOULDER: Primary | ICD-10-CM

## 2021-07-14 DIAGNOSIS — M79.7 FIBROMYALGIA: ICD-10-CM

## 2021-07-14 DIAGNOSIS — F17.200 TOBACCO DEPENDENCE: ICD-10-CM

## 2021-07-14 DIAGNOSIS — R20.0 NUMBNESS AND TINGLING IN RIGHT HAND: ICD-10-CM

## 2021-07-14 PROCEDURE — 99214 OFFICE O/P EST MOD 30 MIN: CPT | Performed by: FAMILY MEDICINE

## 2021-07-14 RX ORDER — QUETIAPINE FUMARATE 25 MG/1
12.5-25 TABLET, FILM COATED ORAL NIGHTLY
Qty: 30 TABLET | Refills: 1 | Status: SHIPPED | OUTPATIENT
Start: 2021-07-14 | End: 2021-08-17 | Stop reason: SDUPTHER

## 2021-07-14 RX ORDER — GABAPENTIN 600 MG/1
600 TABLET ORAL 3 TIMES DAILY
Qty: 90 TABLET | Refills: 2 | Status: SHIPPED | OUTPATIENT
Start: 2021-07-14 | End: 2021-11-29 | Stop reason: SDUPTHER

## 2021-07-14 NOTE — LETTER
[address]  Phone: [phone]  Fax: [phone]    Randalyn Gilford, MD        July 14, 2021     Patient: Marcia Gaytan   YOB: 1960   Date of Visit: 7/14/2021       To Whom It May Concern: It is my medical opinion that Jose Hemphill may return to work on 7/19 with the following restrictions: lifting/carrying not to exceed 20 lbs. If you have any questions or concerns, please don't hesitate to call.     Sincerely,        Randalyn Gilford, MD

## 2021-07-14 NOTE — PROGRESS NOTES
2021    TELEHEALTH EVALUATION -- Audio/Visual (During [facility]QSU[facility]-20 public health emergency)    HPI:    Sherri Pallas (:  1960) has requested an audio/video evaluation for the following concern(s):    Chief Complaint   Patient presents with    Follow-up     wants to get updated to return to work      Hx fibromyalgia and several orthopedic pains/problems, chronic. Shoulder pain mostly. RUE weakness. Last visit 5/10/21  EMG done 2021:  EMG DIAGNOSES:  1.  Moderately severe right ulnar nerve lesion at the elbow. 2.  Mild right median nerve lesion at the wrist (carpal tunnel  syndrome). 3. [place] Gentleman is no electrophysiological evidence for cervical radiculopathy  in this study. She ended up going to ortho at [facility], Dr Pablito Lo. Saw her on   Dx with:    G25.89 Scapular dyskinesis Yes    M25.512 Left shoulder pain, unspecified chronicity    M79.601 Right arm pain    G56.21 Cubital tunnel syndrome on right    G56.01 Carpal tunnel syndrome on right     Underwent PT for these, has been to at least 10 sessions, once a week, not working at her usual job for rest. On STD. Using brace from DR Pablito Lo. She has not met with Dr Pablito Lo since initial visit. She has quit smoking fully for about a month. Using patches. She feels 100% improvement R forearm pain and 90% improvement in numbness/tingling in median distribution with manual functions like putting on mascara (she has had prior carpal tunnel release on R, in her 20's)    Pain in left shoulder is only 25% better, but is improving. Pain increases with arm elevation with forward flexion. Pain meds: gabapentin for fibromyalgia. No longer using naproxen. Uses Ibuprofen 2-3 times a week. She wishes to return to work. She is uncertain if she can function without restrictions. PT recommends 20 lb lifting weight restriction when they were talking informally. Next PT visit scheduled for Monday the .   That is also the day Vishal Tracy needs to return to work because STD runs out and she is in danger of losing job and insurance. No advanced imaging of left shoulder. Review of Systems    Patient Active Problem List   Diagnosis    Hot flashes    Vitamin D deficiency- 15 on 3/14/14    Fibromyalgia    Insomnia    Major depression (HCC)    Tobacco dependence    Ulnar neuropathy at elbow    Lateral epicondylitis of both elbows    Hyperglycemia-  (3/18)    Chronic anxiety    DDD (degenerative disc disease), cervical        Prior to Visit Medications    Medication Sig Taking? Authorizing Provider   nicotine (NICODERM CQ) 21 MG/24HR Place 1 patch onto the skin every 24 hours Yes Taylor Liz MD   QUEtiapine (SEROQUEL) 25 MG tablet Take 0.5-1 tablets by mouth nightly Yes Taylor Liz MD   gabapentin (NEURONTIN) 600 MG tablet Take 1 tablet by mouth 3 times daily for 30 days. Yes Taylor Liz MD   naproxen (NAPROSYN) 500 MG tablet Take 1 tablet by mouth 2 times daily (with meals)  Patient not taking: Reported on 7/14/2021  Taylor Liz MD       Social History     Tobacco Use    Smoking status: Current Every Day Smoker     Packs/day: 0.10     Years: 30.00     Pack years: 3.00     Types: Cigarettes    Smokeless tobacco: Never Used    Tobacco comment: using nicotine patches to quit- down to 3/day   Substance Use Topics    Alcohol use: Yes     Alcohol/week: 0.0 standard drinks     Comment: rare use    Drug use: No            PHYSICAL EXAMINATION:  Physical Exam  Constitutional:       General: She is not in acute distress. Appearance: Normal appearance. She is not ill-appearing. Pulmonary:      Effort: Pulmonary effort is normal.   Skin:     General: Skin is warm. Neurological:      General: No focal deficit present. Mental Status: She is alert and oriented to person, place, and time. Mental status is at baseline.    Psychiatric:         Mood and Affect: Mood normal.         Behavior: Behavior normal. Thought Content: Thought content normal.         Judgment: Judgment normal.          ASSESSMENT/PLAN:    1. Impingement syndrome of left shoulder  - offered and encouraged shoulder steroid injection to aid in her rehab/healing. She really does not wish to do this. - continue with PT (loc)  - continue HEP on shoulder  - continue tobacco free  - add daily nsaid (daypro 1200/d x 30 days)  - OK to return to work 7/19 with 20 lb lifting restriction  - recheck 4 wks for     2. Numbness and tingling in right hand  - recurrent CTS?  - hopefully with nsaid therapy and continued tobacco cessation this will lessen and resolve. 3. Fibromyalgia  - Stable; continue current meds:  - QUEtiapine (SEROQUEL) 25 MG tablet; Take 0.5-1 tablets by mouth nightly  Dispense: 30 tablet; Refill: 1  - gabapentin (NEURONTIN) 600 MG tablet; Take 1 tablet by mouth 3 times daily for 30 days. Dispense: 90 tablet; Refill: 2    4. Tobacco dependence  - congratulated on cessation! Continue NRT products for at least 2-3 mo to help remain quit. O kto use nicotine lozenges for prn use if has cravings. No follow-ups on file. Guy Spence is a 64 y.o. female being evaluated by a Virtual Visit (video visit) encounter to address concerns as mentioned above. A caregiver was present when appropriate. Due to this being a TeleHealth encounter (During [facility]- public health emergency), evaluation of the following organ systems was limited: Vitals/Constitutional/EENT/Resp/CV/GI//MS/Neuro/Skin/Heme-Lymph-Imm. Pursuant to the emergency declaration under the [facility], [facility] authority and the [employer] Act, this Virtual Visit was conducted with patient's (and/or legal guardian's) consent, to reduce the patient's risk of exposure to COVID-19 and provide necessary medical care.   The patient (and/or legal guardian) has also been advised to contact this office for worsening conditions or problems, and seek emergency medical treatment and/or call 911 if deemed necessary. Patient identification was verified at the start of the visit: Yes    Services were provided through a video synchronous discussion virtually to substitute for in-person clinic visit. Patient and provider were located at their individual homes. --Fran Webster MD on 7/14/2021 at 8:40 AM    An electronic signature was used to authenticate this note.

## 2021-07-14 NOTE — PROGRESS NOTES
Made appt and got fax number to send her paper. [phone], but it is not working right now need to fax tomorrow.

## 2021-08-17 ENCOUNTER — OFFICE VISIT (OUTPATIENT)
Dept: FAMILY MEDICINE CLINIC | Age: 61
End: 2021-08-17
Payer: COMMERCIAL

## 2021-08-17 VITALS
HEIGHT: 65 IN | DIASTOLIC BLOOD PRESSURE: 60 MMHG | HEART RATE: 80 BPM | SYSTOLIC BLOOD PRESSURE: 100 MMHG | BODY MASS INDEX: 20.66 KG/M2 | OXYGEN SATURATION: 96 % | WEIGHT: 124 LBS

## 2021-08-17 DIAGNOSIS — M79.7 FIBROMYALGIA: ICD-10-CM

## 2021-08-17 DIAGNOSIS — M75.42 IMPINGEMENT SYNDROME OF LEFT SHOULDER: Primary | ICD-10-CM

## 2021-08-17 DIAGNOSIS — Z12.11 SCREEN FOR COLON CANCER: ICD-10-CM

## 2021-08-17 DIAGNOSIS — Z12.31 SCREENING MAMMOGRAM, ENCOUNTER FOR: ICD-10-CM

## 2021-08-17 DIAGNOSIS — Z87.891 HISTORY OF TOBACCO USE: ICD-10-CM

## 2021-08-17 DIAGNOSIS — G56.21 ULNAR NEUROPATHY AT ELBOW OF RIGHT UPPER EXTREMITY: ICD-10-CM

## 2021-08-17 PROCEDURE — 99214 OFFICE O/P EST MOD 30 MIN: CPT | Performed by: FAMILY MEDICINE

## 2021-08-17 RX ORDER — NICOTINE 21 MG/24HR
1 PATCH, TRANSDERMAL 24 HOURS TRANSDERMAL EVERY 24 HOURS
Qty: 30 PATCH | Refills: 5 | Status: SHIPPED | OUTPATIENT
Start: 2021-08-17 | End: 2022-11-03

## 2021-08-17 RX ORDER — QUETIAPINE FUMARATE 25 MG/1
12.5-25 TABLET, FILM COATED ORAL NIGHTLY
Qty: 30 TABLET | Refills: 1 | Status: SHIPPED | OUTPATIENT
Start: 2021-08-17 | End: 2021-11-29 | Stop reason: SDUPTHER

## 2021-08-17 NOTE — PROGRESS NOTES
2021    Blood pressure 100/60, pulse 80, height 5' 5\" (1.651 m), weight 124 lb (56.2 kg), SpO2 96 %, not currently breastfeeding. Gabbi Guajardo (:  1960) is a 64 y.o. female, here for evaluation of the following medical concerns:    Chief Complaint   Patient presents with    Follow-up     f/u check     F/u on her chronic left shoulder pain and ulnar neuropathy on the R, and fibromyalgia. Saw DR Donna Cortez at [facility].  Dx with scapular dyskenesia and shoulder impengement on the left with R CTS after prior release. She returned to work on     She has graduated from PT with [employer]. She had plateaued in benefit (mainly working on shoulder). She has remained tobacco free! Using patches still 14 mg dose still.  seh still thinks about smoking every day. She is encouraged that her R arm is much improved. No longer on daily nsaid therapy. She limits lifting to 20 lb and does not do overhead motions to keep her left shoulder from hurting. Denies weakness. numbness in hands. No longer dropping things. She is using gabapentin for fibro with seroquel for sleep. It 'comes and goes' and is overall improved. Using a 'heat/massage shawl' also that feels good. Patient Active Problem List   Diagnosis    Hot flashes    Vitamin D deficiency- 15 on 3/14/14    Fibromyalgia    Insomnia    Major depression (HCC)    Tobacco dependence    Ulnar neuropathy at elbow    Lateral epicondylitis of both elbows    Hyperglycemia-  (3/18)    Chronic anxiety    DDD (degenerative disc disease), cervical        Body mass index is 20.63 kg/m². Wt Readings from Last 3 Encounters:   21 124 lb (56.2 kg)   05/10/21 125 lb (56.7 kg)   21 124 lb (56.2 kg)       BP Readings from Last 3 Encounters:   21 100/60   05/10/21 102/64   21 100/62       No Known Allergies    Prior to Visit Medications    Medication Sig Taking?  Authorizing Provider   QUEtiapine (SEROQUEL) 25 MG tablet Take 0.5-1 tablets by mouth nightly Yes Canelo Rodrigues MD   gabapentin (NEURONTIN) 600 MG tablet Take 1 tablet by mouth 3 times daily for 30 days. Yes Canelo Rodrigues MD   nicotine (NICODERM CQ) 21 MG/24HR Place 1 patch onto the skin every 24 hours Yes Canelo Rodrigues MD   naproxen (NAPROSYN) 500 MG tablet Take 1 tablet by mouth 2 times daily (with meals)  Patient not taking: Reported on 7/14/2021  Canelo Rodrigues MD        Social History     Tobacco Use    Smoking status: Current Every Day Smoker     Packs/day: 0.10     Years: 30.00     Pack years: 3.00     Types: Cigarettes    Smokeless tobacco: Never Used    Tobacco comment: using nicotine patches to quit- down to 3/day   Substance Use Topics    Alcohol use: Yes     Alcohol/week: 0.0 standard drinks     Comment: rare use    Drug use: No       Review of Systems  As above     Physical Exam  Constitutional:       General: She is not in acute distress. Appearance: Normal appearance. She is not ill-appearing. Pulmonary:      Effort: Pulmonary effort is normal.   Musculoskeletal:      Comments:  5/5  Sensation intact in hands    L shoulder: FROM, + impingement with abduction/forward flexion. + bipcital tenderness in groove. Skin:     General: Skin is warm. Neurological:      General: No focal deficit present. Mental Status: She is alert and oriented to person, place, and time. Mental status is at baseline. Psychiatric:         Mood and Affect: Mood normal.         Behavior: Behavior normal.         Thought Content: Thought content normal.         Judgment: Judgment normal.         ASSESSMENT/PLAN:    1. Impingement syndrome of left shoulder  - improving. Ok to stop nsaids  I think she should still avoid overhead lifting and 20 lb lifting restriction at work  discussed and demonstrated theraband exercises for RTC strengthening and posture improving exercises.     2. Fibromyalgia  - Stable; continue current meds.    3. Ulnar neuropathy at elbow of right upper extremity  - this is much improved. Congratulated on tobacco cessation (I think this has helped the most) and encouraged her to continue tobacco free. discussed cancer screening- she accepts FIT test and mammogram order. Follow up: 6 mo    An  electronicsignature was used to authenticate this note.     --Nataliia Cazares MD on 8/17/2021 at 3:01 PM

## 2022-02-15 ENCOUNTER — OFFICE VISIT (OUTPATIENT)
Dept: FAMILY MEDICINE CLINIC | Age: 62
End: 2022-02-15
Payer: COMMERCIAL

## 2022-02-15 VITALS
HEART RATE: 82 BPM | WEIGHT: 126 LBS | DIASTOLIC BLOOD PRESSURE: 60 MMHG | SYSTOLIC BLOOD PRESSURE: 90 MMHG | OXYGEN SATURATION: 98 % | HEIGHT: 65 IN | BODY MASS INDEX: 20.99 KG/M2

## 2022-02-15 DIAGNOSIS — Z87.891 HISTORY OF TOBACCO USE: ICD-10-CM

## 2022-02-15 DIAGNOSIS — E55.9 VITAMIN D DEFICIENCY: ICD-10-CM

## 2022-02-15 DIAGNOSIS — F41.9 CHRONIC ANXIETY: ICD-10-CM

## 2022-02-15 DIAGNOSIS — F33.0 MILD EPISODE OF RECURRENT MAJOR DEPRESSIVE DISORDER (HCC): ICD-10-CM

## 2022-02-15 DIAGNOSIS — R73.9 HYPERGLYCEMIA: ICD-10-CM

## 2022-02-15 DIAGNOSIS — F51.04 PSYCHOPHYSIOLOGICAL INSOMNIA: ICD-10-CM

## 2022-02-15 DIAGNOSIS — M79.7 FIBROMYALGIA: Primary | ICD-10-CM

## 2022-02-15 DIAGNOSIS — M24.80 GENERALIZED HYPERMOBILITY OF JOINTS: ICD-10-CM

## 2022-02-15 DIAGNOSIS — Z13.220 SCREENING, LIPID: ICD-10-CM

## 2022-02-15 DIAGNOSIS — Z12.11 SCREEN FOR COLON CANCER: ICD-10-CM

## 2022-02-15 PROCEDURE — 99214 OFFICE O/P EST MOD 30 MIN: CPT | Performed by: FAMILY MEDICINE

## 2022-02-15 RX ORDER — RAMELTEON 8 MG/1
8 TABLET ORAL NIGHTLY
Qty: 90 TABLET | Refills: 1 | Status: SHIPPED | OUTPATIENT
Start: 2022-02-15 | End: 2022-11-03

## 2022-02-15 RX ORDER — GABAPENTIN 800 MG/1
800 TABLET ORAL 3 TIMES DAILY
Qty: 90 TABLET | Refills: 2 | Status: SHIPPED | OUTPATIENT
Start: 2022-02-15 | End: 2022-07-07

## 2022-02-15 RX ORDER — METHOCARBAMOL 750 MG/1
750 TABLET, FILM COATED ORAL 2 TIMES DAILY PRN
Qty: 60 TABLET | Refills: 3 | Status: SHIPPED | OUTPATIENT
Start: 2022-02-15 | End: 2022-09-06

## 2022-02-15 NOTE — PROGRESS NOTES
2/15/2022    Blood pressure 90/60, pulse 82, height 5' 5\" (1.651 m), weight 126 lb (57.2 kg), SpO2 98 %, not currently breastfeeding. Lora Sue (:  1960) is a 63 y.o. female, here for evaluation of the following medical concerns:    Chief Complaint   Patient presents with    Follow-up     f/u for med check  - need for Labs? Here for f/u. She continues to have bothersome, but not totally disruptive pain. Has hx fibromyalgia. (feels better in warm weather, worse in cold/wet weather). Current gabapentin dose is 600 TID. No SE's. Some days are very bad and wishes she had something to use PRN. She was noted just now to be sitting with her leg turned in an odd way, R foot with significant out-turning. Can touch thumb back to forearm. Has hyperextension of elbows and knees. She did quit smoking, but continues to vape nicotine 'sometimes'  No longer using nicoderm, but is keeping them at the ready. Sleep has been hit and miss. Uses a sound machine. Normally gets 4 hrs of uninterrupted sleep, 6 total.   She uses seroquel only prn for sleep aid. Thinks the seroquel made her make abnormal mouth motions. She stopped using vit D. Was as low as 14 when not on therapy. Patient Active Problem List   Diagnosis    Hot flashes    Vitamin D deficiency- 15 on 3/14/14    Fibromyalgia    Insomnia    Major depression (HCC)    Ulnar neuropathy at elbow    Lateral epicondylitis of both elbows    Hyperglycemia-  (3/18)    Chronic anxiety    DDD (degenerative disc disease), cervical    Impingement syndrome of left shoulder    History of tobacco use (started in teens, quit 64. 3/4 ppd mostly    Generalized hypermobility of joints        Body mass index is 20.97 kg/m².     Wt Readings from Last 3 Encounters:   02/15/22 126 lb (57.2 kg)   21 124 lb (56.2 kg)   05/10/21 125 lb (56.7 kg)       BP Readings from Last 3 Encounters:   02/15/22 90/60   21 100/60 05/10/21 102/64       No Known Allergies    Prior to Visit Medications    Medication Sig Taking? Authorizing Provider   ramelteon (ROZEREM) 8 MG tablet Take 1 tablet by mouth nightly Yes Scott Romero MD   gabapentin (NEURONTIN) 800 MG tablet Take 1 tablet by mouth 3 times daily for 90 days. Yes Scott Romero MD   methocarbamol (ROBAXIN-750) 750 MG tablet Take 1 tablet by mouth 2 times daily as needed (body pain) Yes Scott Romero MD   nicotine (NICODERM CQ) 14 MG/24HR Place 1 patch onto the skin every 24 hours  Patient not taking: Reported on 2/15/2022  Scott Romero MD        Social History     Tobacco Use    Smoking status: Current Every Day Smoker     Packs/day: 0.10     Years: 30.00     Pack years: 3.00     Types: Cigarettes    Smokeless tobacco: Never Used    Tobacco comment: uses vape now, no cigarettes   Substance Use Topics    Alcohol use: Yes     Alcohol/week: 0.0 standard drinks     Comment: rare use    Drug use: No       Review of Systems As above     Physical Exam  Vitals and nursing note reviewed. Constitutional:       Appearance: Normal appearance. She is well-developed. Neck:      Thyroid: No thyromegaly. Cardiovascular:      Rate and Rhythm: Normal rate and regular rhythm. Pulses: Normal pulses. Heart sounds: Normal heart sounds. No murmur heard. Pulmonary:      Effort: Pulmonary effort is normal. No respiratory distress. Breath sounds: Normal breath sounds. No wheezing or rales. Musculoskeletal:         General: Normal range of motion. Cervical back: Normal range of motion. Right lower leg: No edema. Left lower leg: No edema. Comments: Displays hyperextension of elbows, thumbs to forearms, palms to floor. Skin:     General: Skin is warm and dry. Neurological:      General: No focal deficit present. Mental Status: She is alert and oriented to person, place, and time. Mental status is at baseline. Psychiatric:         Mood and Affect: Mood normal.         Behavior: Behavior normal.         Thought Content: Thought content normal.         Judgment: Judgment normal.         ASSESSMENT/PLAN:    1. Fibromyalgia  - still with significant discomfort, despite improvement on gabapentin. discussed options, including increasing gabapentin dose, changing to Lyrica, adding SNRI. She elects to try a higher dose of gabapentin:   - gabapentin (NEURONTIN) 800 MG tablet; Take 1 tablet by mouth 3 times daily for 90  days. Dispense: 90 tablet; Refill: 2    2. Psychophysiological insomnia  - improved to 6 hrs/night (4-5 hrs uninterrupted). More sleep may help improve pain; add Rozerem. 3. Mild episode of recurrent major depressive disorder (HCC)  - mood stable without antidepressants currently. 4. Chronic anxiety  - stable currently without anxioulytics    5. History of tobacco use (started in teens, quit 64. 3/4 ppd mostly  - congratulated on cessation. discussed and ordered CT lung screen.  - CT Lung Screen (Initial or Annual); Future    6. Hyperglycemia-  (3/18)  - Hemoglobin A1C; Future  - Comprehensive Metabolic Panel; Future    7. Screening, lipid  - Lipid Panel; Future    8. Vitamin D deficiency- 14 on 3/14/14  - retest vit D level. Restart supplementation if <30.  - Vitamin D 25 Hydroxy; Future    9. Generalized hypermobility of joints  - may in fact have a form of EDS, though I am not skilled in applying Altaf score. She is not interested at this time in further evaluation. Though we discussed that this condition is associated with chronic arthralgias. 10. Screen for colon cancer  - discussed CRC screening; agrees to perform Cologuard fecal DNA after discussing the test and the process. - Fecal DNA Colorectal cancer screening (Cologuard)      Return in about 3 months (around 5/15/2022) for fibromyalgia. An  [employer] was used to authenticate this note.     --Clarance Gosselin, MD on 2/15/2022 at 3:14 PM

## 2022-05-07 DIAGNOSIS — R73.9 HYPERGLYCEMIA: ICD-10-CM

## 2022-05-07 DIAGNOSIS — E55.9 VITAMIN D DEFICIENCY: ICD-10-CM

## 2022-05-07 DIAGNOSIS — Z13.220 SCREENING, LIPID: ICD-10-CM

## 2022-05-07 LAB
A/G RATIO: 2 (ref 1.1–2.2)
ALBUMIN SERPL-MCNC: 4.3 G/DL (ref 3.4–5)
ALP BLD-CCNC: 47 U/L (ref 40–129)
ALT SERPL-CCNC: 13 U/L (ref 10–40)
ANION GAP SERPL CALCULATED.3IONS-SCNC: 12 MMOL/L (ref 3–16)
AST SERPL-CCNC: 14 U/L (ref 15–37)
BILIRUB SERPL-MCNC: 0.3 MG/DL (ref 0–1)
BUN BLDV-MCNC: 23 MG/DL (ref 7–20)
CALCIUM SERPL-MCNC: 9.4 MG/DL (ref 8.3–10.6)
CHLORIDE BLD-SCNC: 104 MMOL/L (ref 99–110)
CHOLESTEROL, TOTAL: 248 MG/DL (ref 0–199)
CO2: 25 MMOL/L (ref 21–32)
CREAT SERPL-MCNC: 0.7 MG/DL (ref 0.6–1.1)
GFR AFRICAN AMERICAN: >60
GFR NON-AFRICAN AMERICAN: >60
GLUCOSE BLD-MCNC: 94 MG/DL (ref 70–99)
HDLC SERPL-MCNC: 72 MG/DL (ref 40–60)
LDL CHOLESTEROL CALCULATED: 163 MG/DL
POTASSIUM SERPL-SCNC: 4.7 MMOL/L (ref 3.5–5.1)
SODIUM BLD-SCNC: 141 MMOL/L (ref 136–145)
TOTAL PROTEIN: 6.4 G/DL (ref 6.4–8.2)
TRIGL SERPL-MCNC: 66 MG/DL (ref 0–150)
VITAMIN D 25-HYDROXY: 30.7 NG/ML
VLDLC SERPL CALC-MCNC: 13 MG/DL

## 2022-05-08 LAB
ESTIMATED AVERAGE GLUCOSE: 122.6 MG/DL
HBA1C MFR BLD: 5.9 %

## 2022-07-07 DIAGNOSIS — M79.7 FIBROMYALGIA: ICD-10-CM

## 2022-07-07 RX ORDER — GABAPENTIN 800 MG/1
TABLET ORAL
Qty: 90 TABLET | Refills: 2 | Status: SHIPPED | OUTPATIENT
Start: 2022-07-07 | End: 2022-11-03 | Stop reason: SDUPTHER

## 2022-07-07 NOTE — TELEPHONE ENCOUNTER
Due for follow up with Dr Lyndsay Lynne- please call them to schedule at their earliest convenience. (meds have been refilled this time)

## 2022-09-06 RX ORDER — METHOCARBAMOL 750 MG/1
TABLET, FILM COATED ORAL
Qty: 60 TABLET | Refills: 2 | Status: SHIPPED | OUTPATIENT
Start: 2022-09-06 | End: 2022-11-03

## 2022-11-03 ENCOUNTER — OFFICE VISIT (OUTPATIENT)
Dept: FAMILY MEDICINE CLINIC | Age: 62
End: 2022-11-03
Payer: COMMERCIAL

## 2022-11-03 VITALS
HEART RATE: 105 BPM | OXYGEN SATURATION: 99 % | WEIGHT: 122.4 LBS | DIASTOLIC BLOOD PRESSURE: 60 MMHG | BODY MASS INDEX: 20.37 KG/M2 | SYSTOLIC BLOOD PRESSURE: 106 MMHG

## 2022-11-03 DIAGNOSIS — M79.7 FIBROMYALGIA: ICD-10-CM

## 2022-11-03 DIAGNOSIS — F41.9 CHRONIC ANXIETY: ICD-10-CM

## 2022-11-03 DIAGNOSIS — F43.21 GRIEF REACTION: Primary | ICD-10-CM

## 2022-11-03 DIAGNOSIS — F33.1 MODERATE EPISODE OF RECURRENT MAJOR DEPRESSIVE DISORDER (HCC): ICD-10-CM

## 2022-11-03 PROCEDURE — 99214 OFFICE O/P EST MOD 30 MIN: CPT | Performed by: FAMILY MEDICINE

## 2022-11-03 RX ORDER — HYDROXYZINE HYDROCHLORIDE 25 MG/1
25 TABLET, FILM COATED ORAL 3 TIMES DAILY PRN
Qty: 90 TABLET | Refills: 0 | Status: SHIPPED | OUTPATIENT
Start: 2022-11-03

## 2022-11-03 RX ORDER — ESCITALOPRAM OXALATE 10 MG/1
10 TABLET ORAL DAILY
Qty: 30 TABLET | Refills: 3 | Status: SHIPPED | OUTPATIENT
Start: 2022-11-03

## 2022-11-03 RX ORDER — GABAPENTIN 800 MG/1
TABLET ORAL
Qty: 90 TABLET | Refills: 5 | Status: SHIPPED | OUTPATIENT
Start: 2022-11-03 | End: 2023-05-02

## 2022-11-03 SDOH — ECONOMIC STABILITY: FOOD INSECURITY: WITHIN THE PAST 12 MONTHS, YOU WORRIED THAT YOUR FOOD WOULD RUN OUT BEFORE YOU GOT MONEY TO BUY MORE.: NEVER TRUE

## 2022-11-03 SDOH — ECONOMIC STABILITY: FOOD INSECURITY: WITHIN THE PAST 12 MONTHS, THE FOOD YOU BOUGHT JUST DIDN'T LAST AND YOU DIDN'T HAVE MONEY TO GET MORE.: NEVER TRUE

## 2022-11-03 ASSESSMENT — PATIENT HEALTH QUESTIONNAIRE - PHQ9
10. IF YOU CHECKED OFF ANY PROBLEMS, HOW DIFFICULT HAVE THESE PROBLEMS MADE IT FOR YOU TO DO YOUR WORK, TAKE CARE OF THINGS AT HOME, OR GET ALONG WITH OTHER PEOPLE: 3
3. TROUBLE FALLING OR STAYING ASLEEP: 3
4. FEELING TIRED OR HAVING LITTLE ENERGY: 3
7. TROUBLE CONCENTRATING ON THINGS, SUCH AS READING THE NEWSPAPER OR WATCHING TELEVISION: 3
SUM OF ALL RESPONSES TO PHQ QUESTIONS 1-9: 24
9. THOUGHTS THAT YOU WOULD BE BETTER OFF DEAD, OR OF HURTING YOURSELF: 3
SUM OF ALL RESPONSES TO PHQ QUESTIONS 1-9: 21
6. FEELING BAD ABOUT YOURSELF - OR THAT YOU ARE A FAILURE OR HAVE LET YOURSELF OR YOUR FAMILY DOWN: 3
SUM OF ALL RESPONSES TO PHQ QUESTIONS 1-9: 24
5. POOR APPETITE OR OVEREATING: 3
8. MOVING OR SPEAKING SO SLOWLY THAT OTHER PEOPLE COULD HAVE NOTICED. OR THE OPPOSITE, BEING SO FIGETY OR RESTLESS THAT YOU HAVE BEEN MOVING AROUND A LOT MORE THAN USUAL: 3
SUM OF ALL RESPONSES TO PHQ QUESTIONS 1-9: 24
2. FEELING DOWN, DEPRESSED OR HOPELESS: 3

## 2022-11-03 ASSESSMENT — COLUMBIA-SUICIDE SEVERITY RATING SCALE - C-SSRS
6. HAVE YOU EVER DONE ANYTHING, STARTED TO DO ANYTHING, OR PREPARED TO DO ANYTHING TO END YOUR LIFE?: NO
1. WITHIN THE PAST MONTH, HAVE YOU WISHED YOU WERE DEAD OR WISHED YOU COULD GO TO SLEEP AND NOT WAKE UP?: YES
3. HAVE YOU BEEN THINKING ABOUT HOW YOU MIGHT KILL YOURSELF?: NO
5. HAVE YOU STARTED TO WORK OUT OR WORKED OUT THE DETAILS OF HOW TO KILL YOURSELF? DO YOU INTEND TO CARRY OUT THIS PLAN?: NO
2. HAVE YOU ACTUALLY HAD ANY THOUGHTS OF KILLING YOURSELF?: YES
4. HAVE YOU HAD THESE THOUGHTS AND HAD SOME INTENTION OF ACTING ON THEM?: NO

## 2022-11-03 ASSESSMENT — SOCIAL DETERMINANTS OF HEALTH (SDOH): HOW HARD IS IT FOR YOU TO PAY FOR THE VERY BASICS LIKE FOOD, HOUSING, MEDICAL CARE, AND HEATING?: NOT HARD AT ALL

## 2022-11-03 NOTE — PATIENT INSTRUCTIONS
Lexapro (ecitalopram) 10 mg. Use 1/2 tablet for the first week to 10 days. Then increase to 10 mg daily. (Can take in AM or the PM, depending on if it makes you feel tired or not)    Hydroxyzine could cause sedation. Be careful if you take it during the day and you need to drive.

## 2022-11-03 NOTE — PROGRESS NOTES
11/3/2022    Blood pressure 106/60, pulse (!) 105, weight 122 lb 6.4 oz (55.5 kg), SpO2 99 %, not currently breastfeeding. John Lo (:  1960) is a 63 y.o. female, here for evaluation of the following medical concerns:    Chief Complaint   Patient presents with    Medication Check     Feeling very depressed. 80 yr old father passed away 2 weeks ago. He was elderly, but had not been ill. So it was rather sudden. She says she not coping very well. She hsa autistic brother that lived with father. Her sister has intelligence issues as well. So she has been under a great deal of stress also in dealing with the details. Selling the house. Dealing with the will. Worried about a brother in [place] who is odd as well, and hard to deal with. An irritating person she is fearful will want to have a say in everything. She has been off work the past 2 wks. Returns on Monday. Sleeping OK, but keeping late to bed-late to rise hours. She is feelign a ton of anxiety about the whole situation as well as grieving her father. Very irritable and edgy. Still cries at times, but is not 'nonfunctional.'  Does not want to do dishes or go to work,  but she will. Has hx MDD in past, anxiety. Fibromyalgia also. Previous meds used: Zoloft, cymbalta, pristiq, savella, lexparo, paxil, celexa. Many of these she never started. Others had side effects. Current meds: gabapentin for fibro; not robaxin.       Patient Active Problem List   Diagnosis    Hot flashes    Vitamin D deficiency- 14 on 3/14/14    Fibromyalgia    Insomnia    Major depression (HCC)    Ulnar neuropathy at elbow    Lateral epicondylitis of both elbows    Hyperglycemia-  (3/18)    Chronic anxiety    DDD (degenerative disc disease), cervical    Impingement syndrome of left shoulder    History of tobacco use (started in teens, quit 64. 3/4 ppd mostly    Generalized hypermobility of joints        Body mass index is 20.37 kg/m². Wt Readings from Last 3 Encounters:   11/03/22 122 lb 6.4 oz (55.5 kg)   02/15/22 126 lb (57.2 kg)   08/17/21 124 lb (56.2 kg)       BP Readings from Last 3 Encounters:   11/03/22 106/60   02/15/22 90/60   08/17/21 100/60       No Known Allergies    Prior to Visit Medications    Medication Sig Taking? Authorizing Provider   methocarbamol (ROBAXIN) 750 MG tablet TAKE 1 TABLET BY MOUTH TWO TIMES A DAY AS NEEDED FOR body PAIN  Patient not taking: Reported on 11/3/2022  Vertis Olszewski, MD   gabapentin (NEURONTIN) 800 MG tablet TAKE 1 TABLET BY MOUTH THREE TIMES A DAY  Vertis Olszewski, MD   ramelteon (ROZEREM) 8 MG tablet Take 1 tablet by mouth nightly  Patient not taking: Reported on 11/3/2022  Vertis Olszewski, MD   nicotine (NICODERM CQ) 14 MG/24HR Place 1 patch onto the skin every 24 hours  Patient not taking: Reported on 2/15/2022  Vertis Olszewski, MD        Social History     Tobacco Use    Smoking status: Every Day     Packs/day: 0.10     Years: 30.00     Pack years: 3.00     Types: Cigarettes    Smokeless tobacco: Never    Tobacco comments:     uses vape now, no cigarettes   Substance Use Topics    Alcohol use: Yes     Alcohol/week: 0.0 standard drinks     Comment: rare use    Drug use: No       Review of Systems As above     Physical Exam  Constitutional:       General: She is not in acute distress. Appearance: Normal appearance. She is not ill-appearing. Pulmonary:      Effort: Pulmonary effort is normal.   Neurological:      General: No focal deficit present. Mental Status: She is alert and oriented to person, place, and time. Mental status is at baseline. Psychiatric:         Mood and Affect: Mood normal.         Behavior: Behavior normal.         Thought Content:  Thought content normal.         Judgment: Judgment normal.       ASSESSMENT/PLAN:    1. Grief reaction  - explored and discussed concepts of grief, loss, love and that the experience of grief happens on it's own timeframe. Encouraged to remain close to her anchor points ( mostly). Declines referral to counselor to help process her emotions. 2. Chronic anxiety  - flared currently due to grief, multiple stressors. Requesting to begin medical treatment as se expects her emotions to be difficult to manage for some time to come. She is wiling  to try SSRI therapy as it can help both anxiety and depression symptoms. Start Lexapro 5 mg. Incr to 10 mg in 7-10 days. We discussed the risks/benefits/alternatives and side effects to be aware of.   - for prn use, try Atarax 12.5-25 mg tabs up to TID. Cautioned about sedation. 3. Moderate episode of recurrent major depressive disorder ([facility] [address]  - As above. Denies suicidality. 4. Fibromyalgia  - gabapentin is beneficial and she wishes to continue.  - gabapentin (NEURONTIN) 800 MG tablet; TAKE 1 TABLET BY MOUTH THREE TIMES A DAY  Dispense: 90 tablet; Refill: 5    Return in about 6 weeks (around 12/15/2022). An  [employer]ature was used to authenticate this note.     --Zaid Walker MD on 11/3/2022 at 3:20 PM

## 2022-11-03 NOTE — LETTER
[address] [facility] [address] [facility]  Phone: [phone]  Fax: [phone]    Terry Almanzar MD        November 3, 2022     Patient: Daryn Rodrigues   YOB: 1960   Date of Visit: 11/3/2022       To Whom It May Concern: It is my medical opinion that Hermila Fuentes should remain out of work until 11/7/22. If you have any questions or concerns, please don't hesitate to call.     Sincerely,        Terry Almanzar MD

## 2022-12-12 ENCOUNTER — COMMUNITY OUTREACH (OUTPATIENT)
Dept: FAMILY MEDICINE CLINIC | Age: 62
End: 2022-12-12

## 2022-12-16 ENCOUNTER — OFFICE VISIT (OUTPATIENT)
Dept: FAMILY MEDICINE CLINIC | Age: 62
End: 2022-12-16
Payer: COMMERCIAL

## 2022-12-16 VITALS
BODY MASS INDEX: 20.36 KG/M2 | RESPIRATION RATE: 16 BRPM | HEART RATE: 84 BPM | SYSTOLIC BLOOD PRESSURE: 138 MMHG | WEIGHT: 122.2 LBS | OXYGEN SATURATION: 97 % | DIASTOLIC BLOOD PRESSURE: 70 MMHG | HEIGHT: 65 IN

## 2022-12-16 DIAGNOSIS — F41.9 CHRONIC ANXIETY: ICD-10-CM

## 2022-12-16 DIAGNOSIS — F33.1 MODERATE EPISODE OF RECURRENT MAJOR DEPRESSIVE DISORDER (HCC): Primary | ICD-10-CM

## 2022-12-16 PROCEDURE — 99214 OFFICE O/P EST MOD 30 MIN: CPT | Performed by: FAMILY MEDICINE

## 2022-12-16 RX ORDER — ARIPIPRAZOLE 5 MG/1
5 TABLET ORAL DAILY
Qty: 30 TABLET | Refills: 3 | Status: SHIPPED | OUTPATIENT
Start: 2022-12-16

## 2022-12-16 NOTE — PROGRESS NOTES
2022    Blood pressure 138/70, pulse 84, resp. rate 16, height 5' 5\" (1.651 m), weight 122 lb 3.2 oz (55.4 kg), SpO2 97 %, not currently breastfeeding. Camille Rivas (:  1960) is a 63 y.o. female, here for evaluation of the following medical concerns:    Chief Complaint   Patient presents with    Anxiety     6 week follow-up. Patient does not like the Lexapro, would like to discuss different medication. Here for routine follow up of anxiety, depression; grief after father  in October. We had a long discussion about medicine last visit. She has used numerous [facility] meds over the years with uneven, mostly bad responses or side effects. Last visit she started Lexapro 5 mg for 10 days, then 10 mg for past 4 wks. She reports that she has noted decrease in crying. Which she appreciates. But still feels her anger and agitation is still present. She does not feel the Lexapro has gotten to the core of how she feels emotionally. She has noted increased nausea since starting ('my stomach keeps talking')    No SI  (occ fleeting thoughts of death)  Sleep has been poor. No panic attacks    She says her kids have been treated with Viibryd and Abilify and responded well. Atarax did not help anxiety- it caused intense sedation. Patient Active Problem List   Diagnosis    Hot flashes    Vitamin D deficiency- 14 on 3/14/14    Fibromyalgia    Insomnia    Major depression (HCC)    Ulnar neuropathy at elbow    Lateral epicondylitis of both elbows    Hyperglycemia-  (3/18)    Chronic anxiety    DDD (degenerative disc disease), cervical    Impingement syndrome of left shoulder    History of tobacco use (started in teens, quit 64. 3/4 ppd mostly    Generalized hypermobility of joints        Body mass index is 20.34 kg/m².     Wt Readings from Last 3 Encounters:   22 122 lb 3.2 oz (55.4 kg)   22 122 lb 6.4 oz (55.5 kg)   02/15/22 126 lb (57.2 kg)       BP Readings from Last 3 Encounters:   12/16/22 138/70   11/03/22 106/60   02/15/22 90/60       No Known Allergies    Prior to Visit Medications    Medication Sig Taking? Authorizing Provider   escitalopram (LEXAPRO) 10 MG tablet Take 1 tablet by mouth daily Yes Earl Eid MD   gabapentin (NEURONTIN) 800 MG tablet TAKE 1 TABLET BY MOUTH THREE TIMES A DAY Yes Earl Eid MD   hydrOXYzine HCl (ATARAX) 25 MG tablet Take 1 tablet by mouth 3 times daily as needed for Anxiety  Earl Eid MD        Social History     Tobacco Use    Smoking status: Every Day     Packs/day: 0.10     Years: 30.00     Pack years: 3.00     Types: Cigarettes    Smokeless tobacco: Never    Tobacco comments:     uses vape now, no cigarettes   Substance Use Topics    Alcohol use: Yes     Alcohol/week: 0.0 standard drinks     Comment: rare use    Drug use: No       Review of Systems As above     Physical Exam  Constitutional:       General: She is not in acute distress. Appearance: Normal appearance. She is not ill-appearing. Pulmonary:      Effort: Pulmonary effort is normal.   Neurological:      General: No focal deficit present. Mental Status: She is alert and oriented to person, place, and time. Mental status is at baseline. Psychiatric:         Mood and Affect: Mood normal.         Behavior: Behavior normal.         Thought Content: Thought content normal.         Judgment: Judgment normal.       ASSESSMENT/PLAN:    1. Moderate episode of recurrent major depressive disorder ([address]  - minimal (but some) improvement since beginning Lexapro. We discussed treatment options for meds. ..  add Abilify 5 mg (2.5 mg first week or so) nightly. - treatment options in the future if further benefit is needed includes dose increase in Abilify or Lexapro. She is interested in [address] as an alternative SSRI option since it helped her daughter. 2. Chronic anxiety  - As above; mood has been more depressed than anxious of late.  She does not use hydroxyzine- it is too sedating. However she is free to use 1/2-1 tab qhs if sleep aid is needed. Return in about 8 weeks (around 2/10/2023) for follow up depression. An  [employer] was used to authenticate this note.     --Earl Eid MD on 12/16/2022 at 2:49 PM

## 2023-01-23 RX ORDER — ARIPIPRAZOLE 10 MG/1
10 TABLET ORAL DAILY
Qty: 30 TABLET | Refills: 2 | Status: SHIPPED | OUTPATIENT
Start: 2023-01-23

## 2023-02-10 ENCOUNTER — OFFICE VISIT (OUTPATIENT)
Dept: FAMILY MEDICINE CLINIC | Age: 63
End: 2023-02-10
Payer: COMMERCIAL

## 2023-02-10 VITALS
DIASTOLIC BLOOD PRESSURE: 60 MMHG | HEIGHT: 65 IN | HEART RATE: 81 BPM | WEIGHT: 123.6 LBS | SYSTOLIC BLOOD PRESSURE: 112 MMHG | BODY MASS INDEX: 20.59 KG/M2 | RESPIRATION RATE: 16 BRPM | OXYGEN SATURATION: 96 %

## 2023-02-10 DIAGNOSIS — F33.1 MODERATE EPISODE OF RECURRENT MAJOR DEPRESSIVE DISORDER (HCC): ICD-10-CM

## 2023-02-10 DIAGNOSIS — Z12.31 SCREENING MAMMOGRAM FOR BREAST CANCER: ICD-10-CM

## 2023-02-10 DIAGNOSIS — M79.7 FIBROMYALGIA: ICD-10-CM

## 2023-02-10 DIAGNOSIS — F51.04 PSYCHOPHYSIOLOGICAL INSOMNIA: ICD-10-CM

## 2023-02-10 DIAGNOSIS — Z12.11 SCREEN FOR COLON CANCER: ICD-10-CM

## 2023-02-10 DIAGNOSIS — F41.9 CHRONIC ANXIETY: Primary | ICD-10-CM

## 2023-02-10 PROCEDURE — 99214 OFFICE O/P EST MOD 30 MIN: CPT | Performed by: FAMILY MEDICINE

## 2023-02-10 RX ORDER — ESCITALOPRAM OXALATE 10 MG/1
10 TABLET ORAL DAILY
Qty: 90 TABLET | Refills: 1 | Status: SHIPPED | OUTPATIENT
Start: 2023-02-10

## 2023-02-10 SDOH — ECONOMIC STABILITY: FOOD INSECURITY: WITHIN THE PAST 12 MONTHS, THE FOOD YOU BOUGHT JUST DIDN'T LAST AND YOU DIDN'T HAVE MONEY TO GET MORE.: NEVER TRUE

## 2023-02-10 SDOH — ECONOMIC STABILITY: HOUSING INSECURITY
IN THE LAST 12 MONTHS, WAS THERE A TIME WHEN YOU DID NOT HAVE A STEADY PLACE TO SLEEP OR SLEPT IN A SHELTER (INCLUDING NOW)?: NO

## 2023-02-10 SDOH — ECONOMIC STABILITY: FOOD INSECURITY: WITHIN THE PAST 12 MONTHS, YOU WORRIED THAT YOUR FOOD WOULD RUN OUT BEFORE YOU GOT MONEY TO BUY MORE.: NEVER TRUE

## 2023-02-10 SDOH — ECONOMIC STABILITY: INCOME INSECURITY: HOW HARD IS IT FOR YOU TO PAY FOR THE VERY BASICS LIKE FOOD, HOUSING, MEDICAL CARE, AND HEATING?: NOT HARD AT ALL

## 2023-02-10 ASSESSMENT — PATIENT HEALTH QUESTIONNAIRE - PHQ9
SUM OF ALL RESPONSES TO PHQ QUESTIONS 1-9: 1
9. THOUGHTS THAT YOU WOULD BE BETTER OFF DEAD, OR OF HURTING YOURSELF: 0
SUM OF ALL RESPONSES TO PHQ QUESTIONS 1-9: 1
7. TROUBLE CONCENTRATING ON THINGS, SUCH AS READING THE NEWSPAPER OR WATCHING TELEVISION: 0
2. FEELING DOWN, DEPRESSED OR HOPELESS: 0
4. FEELING TIRED OR HAVING LITTLE ENERGY: 0
5. POOR APPETITE OR OVEREATING: 0
SUM OF ALL RESPONSES TO PHQ9 QUESTIONS 1 & 2: 0
1. LITTLE INTEREST OR PLEASURE IN DOING THINGS: 0
6. FEELING BAD ABOUT YOURSELF - OR THAT YOU ARE A FAILURE OR HAVE LET YOURSELF OR YOUR FAMILY DOWN: 0
8. MOVING OR SPEAKING SO SLOWLY THAT OTHER PEOPLE COULD HAVE NOTICED. OR THE OPPOSITE, BEING SO FIGETY OR RESTLESS THAT YOU HAVE BEEN MOVING AROUND A LOT MORE THAN USUAL: 0
3. TROUBLE FALLING OR STAYING ASLEEP: 1

## 2023-02-10 NOTE — PROGRESS NOTES
2/10/2023    Blood pressure 112/60, pulse 81, resp. rate 16, height 5' 5\" (1.651 m), weight 123 lb 9.6 oz (56.1 kg), SpO2 96 %, not currently breastfeeding. Rossy Gonzalez (:  1960) is a 63 y.o. female, here for evaluation of the following medical concerns:    Chief Complaint   Patient presents with    Depression     Follow-up depression/discuss medications. Feels a lot better. Feeling real tired. Here for routine follow up of MDD. She reports feeling tired. Sleeping 12 hours but feels pretty good throughout the day. This started with using Lexapro with Abilify. But this combo helps depression AND anxious symptoms as well as irritability. Ruminates on father's death less now. Finally getting good sleep though. She wakes with energy. But gets tired by about 7 pm. Thinking she wants to go to bed already. No longer feels dread, with associated nausea. Stress level still high as she and her sibs work through [employer] for [employer]. Also takes gabapentin for fibro. Has not noted fibro pain to be less with better sleep. But feels it is all stable presently with gabapentin. (Pain worsens if she forgets doses)- neck, shoulders mainly. Does not exercise, but walks all day at work. Patient Active Problem List   Diagnosis    Hot flashes    Vitamin D deficiency- 14 on 3/14/14    Fibromyalgia    Insomnia    Major depression (HCC)    Ulnar neuropathy at elbow    Lateral epicondylitis of both elbows    Hyperglycemia-  (3/18)    Chronic anxiety    DDD (degenerative disc disease), cervical    Impingement syndrome of left shoulder    History of tobacco use (started in teens, quit 64. 3/4 ppd mostly    Generalized hypermobility of joints        Body mass index is 20.57 kg/m².     Wt Readings from Last 3 Encounters:   02/10/23 123 lb 9.6 oz (56.1 kg)   22 122 lb 3.2 oz (55.4 kg)   22 122 lb 6.4 oz (55.5 kg)       BP Readings from Last 3 Encounters: 02/10/23 112/60   12/16/22 138/70   11/03/22 106/60       No Known Allergies    Prior to Visit Medications    Medication Sig Taking? Authorizing Provider   ARIPiprazole (ABILIFY) 10 MG tablet Take 1 tablet by mouth daily Yes Ian London MD   escitalopram (LEXAPRO) 10 MG tablet Take 1 tablet by mouth daily Yes Ian London MD   gabapentin (NEURONTIN) 800 MG tablet TAKE 1 TABLET BY MOUTH THREE TIMES A DAY Yes Ian London MD        Social History     Tobacco Use    Smoking status: Every Day     Packs/day: 0.10     Years: 30.00     Pack years: 3.00     Types: Cigarettes    Smokeless tobacco: Never    Tobacco comments:     uses vape now, no cigarettes   Substance Use Topics    Alcohol use: Yes     Alcohol/week: 0.0 standard drinks     Comment: rare use    Drug use: No       Review of Systems As above     Physical Exam  Constitutional:       General: She is not in acute distress. Appearance: Normal appearance. She is not ill-appearing. Pulmonary:      Effort: Pulmonary effort is normal.   Neurological:      General: No focal deficit present. Mental Status: She is alert and oriented to person, place, and time. Mental status is at baseline. Psychiatric:         Mood and Affect: Mood normal.         Behavior: Behavior normal.         Thought Content: Thought content normal.         Judgment: Judgment normal.       ASSESSMENT/PLAN:    1. Chronic anxiety  - improved; continue Lexapro 10 mg. Try taking it in the morning to see if this improves her evening fatigue. 2. Psychophysiological insomnia  - is finally sleeping through night. But perhaps excessive (12 hours). Has been on 10 mg dose of Abilify for 2 wks. Will consider reducing dose to 7.5 mg if this does not improve with time. (Also trying Lexapro in the morning)    3. Fibromyalgia  - currently stable with gabapentin. 4. Moderate episode of recurrent major depressive disorder (HCC)  - mood improved.   Irritability MUCH better with Abilify + lexparo combo. May need to adjust dose for sedation, but she may accept sleeping 12 hours a day to maintain her present mood stability. 5. Screen for colon cancer  - - discussed CRC screening options (including colonoscopy being the 'gold standard'); pt elects to proceed with Cologuard fecal DNA after discussing the test and the process. - Fecal DNA Colorectal cancer screening (Cologuard)    6. Screening mammogram for breast cancer  - she will need to schedule [facility] at [facility]. Referral and encouragement provided. - CHARLY DIGITAL SCREEN W OR WO CAD BILATERAL; Future    Return in about 3 months (around 5/10/2023) for follow up depression, follow up anxiety. An  [employer] was used to authenticate this note.     --Ian London MD on 2/10/2023 at 2:41 PM

## 2023-05-11 ENCOUNTER — OFFICE VISIT (OUTPATIENT)
Dept: FAMILY MEDICINE CLINIC | Age: 63
End: 2023-05-11
Payer: COMMERCIAL

## 2023-05-11 VITALS
WEIGHT: 125 LBS | RESPIRATION RATE: 16 BRPM | DIASTOLIC BLOOD PRESSURE: 60 MMHG | HEIGHT: 65 IN | BODY MASS INDEX: 20.83 KG/M2 | SYSTOLIC BLOOD PRESSURE: 110 MMHG | HEART RATE: 81 BPM | OXYGEN SATURATION: 95 %

## 2023-05-11 DIAGNOSIS — F41.9 CHRONIC ANXIETY: Primary | ICD-10-CM

## 2023-05-11 DIAGNOSIS — M79.7 FIBROMYALGIA: ICD-10-CM

## 2023-05-11 PROCEDURE — 99214 OFFICE O/P EST MOD 30 MIN: CPT | Performed by: FAMILY MEDICINE

## 2023-05-11 RX ORDER — ARIPIPRAZOLE 10 MG/1
10 TABLET ORAL DAILY
Qty: 90 TABLET | Refills: 1 | Status: SHIPPED | OUTPATIENT
Start: 2023-05-11

## 2023-05-11 NOTE — PROGRESS NOTES
2023    Blood pressure 110/60, pulse 81, resp. rate 16, height 5' 5\" (1.651 m), weight 125 lb (56.7 kg), SpO2 95 %, not currently breastfeeding. Linnea Verdugo (:  1960) is a 63 y.o. female, here for evaluation of the following medical concerns:    Chief Complaint   Patient presents with    Medication Check     Patient is here for a medication follow up     Anxiety     Here for f/u. She is not sure she likes her current med combo. With adding Abilify she has less agitation, irritability. But still lacks motivation, low energy. No lust for life. Not sad. Just no emotion. 'I have not cried' in a long time. Feels emotionally restricted. Not sad, but not able to find eris in things. Says things 'get under my skin' all her life. This is less on Abilify. She is not that social.  Introverted all her life. She and all her sisters are treated for anxiety. Started Lexapro in November. She tried reducing her dose to 5 mg, but she is still not liking it. Sleeping fine. Hx fibromyalgia. Did not do well with SNRI's. Gabapentin is helping a lot with her pain. Not 100% though. Quit smoking     Last renal function test:   Lab Results   Component Value Date/Time     2022 08:12 AM    K 4.7 2022 08:12 AM    BUN 23 2022 08:12 AM    CREATININE 0.7 2022 08:12 AM     CrCl cannot be calculated (Patient's most recent lab result is older than the maximum 180 days allowed. ).    Last lipid test:  Lab Results   Component Value Date    CHOL 248 (H) 2022    TRIG 66 2022    HDL 72 (H) 2022    LDLCALC 163 (H) 2022     Lab Results   Component Value Date    ALT 13 2022    AST 14 (L) 2022       The 10-year ASCVD risk score (Samara GREGORY, et al., 2019) is: 5.1%    Values used to calculate the score:      Age: 63 years      Sex: Female      Is Non- : No      Diabetic: No      Tobacco smoker: Yes      Systolic Blood Pressure:

## 2023-08-10 ENCOUNTER — OFFICE VISIT (OUTPATIENT)
Dept: FAMILY MEDICINE CLINIC | Age: 63
End: 2023-08-10
Payer: COMMERCIAL

## 2023-08-10 VITALS
WEIGHT: 124 LBS | RESPIRATION RATE: 18 BRPM | TEMPERATURE: 97.8 F | SYSTOLIC BLOOD PRESSURE: 88 MMHG | HEART RATE: 67 BPM | OXYGEN SATURATION: 95 % | HEIGHT: 65 IN | DIASTOLIC BLOOD PRESSURE: 60 MMHG | BODY MASS INDEX: 20.66 KG/M2

## 2023-08-10 DIAGNOSIS — G89.29 CHRONIC RIGHT SHOULDER PAIN: Primary | ICD-10-CM

## 2023-08-10 DIAGNOSIS — M25.512 ACUTE PAIN OF LEFT SHOULDER: ICD-10-CM

## 2023-08-10 DIAGNOSIS — M25.511 CHRONIC RIGHT SHOULDER PAIN: Primary | ICD-10-CM

## 2023-08-10 PROCEDURE — 99214 OFFICE O/P EST MOD 30 MIN: CPT | Performed by: FAMILY MEDICINE

## 2023-08-10 RX ORDER — MELOXICAM 15 MG/1
15 TABLET ORAL DAILY
Qty: 30 TABLET | Refills: 0 | Status: SHIPPED | OUTPATIENT
Start: 2023-08-10

## 2023-11-13 DIAGNOSIS — M79.7 FIBROMYALGIA: ICD-10-CM

## 2023-11-13 RX ORDER — ARIPIPRAZOLE 10 MG/1
10 TABLET ORAL DAILY
Qty: 90 TABLET | Refills: 0 | Status: SHIPPED | OUTPATIENT
Start: 2023-11-13

## 2023-11-13 RX ORDER — GABAPENTIN 800 MG/1
TABLET ORAL
Qty: 90 TABLET | Refills: 2 | Status: SHIPPED | OUTPATIENT
Start: 2023-11-13 | End: 2024-05-20

## 2023-11-13 RX ORDER — ARIPIPRAZOLE 10 MG/1
10 TABLET ORAL DAILY
Qty: 90 TABLET | Refills: 1 | OUTPATIENT
Start: 2023-11-13

## 2024-01-18 ENCOUNTER — OFFICE VISIT (OUTPATIENT)
Dept: FAMILY MEDICINE CLINIC | Age: 64
End: 2024-01-18
Payer: COMMERCIAL

## 2024-01-18 VITALS
TEMPERATURE: 98.3 F | HEART RATE: 86 BPM | WEIGHT: 124 LBS | SYSTOLIC BLOOD PRESSURE: 90 MMHG | BODY MASS INDEX: 20.63 KG/M2 | DIASTOLIC BLOOD PRESSURE: 58 MMHG | RESPIRATION RATE: 18 BRPM | OXYGEN SATURATION: 98 %

## 2024-01-18 DIAGNOSIS — F33.1 MODERATE EPISODE OF RECURRENT MAJOR DEPRESSIVE DISORDER (HCC): Primary | ICD-10-CM

## 2024-01-18 PROCEDURE — 99214 OFFICE O/P EST MOD 30 MIN: CPT | Performed by: FAMILY MEDICINE

## 2024-01-18 RX ORDER — FLUOXETINE 10 MG/1
CAPSULE ORAL
Qty: 30 CAPSULE | Refills: 3 | Status: SHIPPED | OUTPATIENT
Start: 2024-01-18 | End: 2024-01-19 | Stop reason: SDUPTHER

## 2024-01-18 ASSESSMENT — PATIENT HEALTH QUESTIONNAIRE - PHQ9
9. THOUGHTS THAT YOU WOULD BE BETTER OFF DEAD, OR OF HURTING YOURSELF: 3
SUM OF ALL RESPONSES TO PHQ QUESTIONS 1-9: 24
SUM OF ALL RESPONSES TO PHQ QUESTIONS 1-9: 27
4. FEELING TIRED OR HAVING LITTLE ENERGY: 3
10. IF YOU CHECKED OFF ANY PROBLEMS, HOW DIFFICULT HAVE THESE PROBLEMS MADE IT FOR YOU TO DO YOUR WORK, TAKE CARE OF THINGS AT HOME, OR GET ALONG WITH OTHER PEOPLE: 3
8. MOVING OR SPEAKING SO SLOWLY THAT OTHER PEOPLE COULD HAVE NOTICED. OR THE OPPOSITE, BEING SO FIGETY OR RESTLESS THAT YOU HAVE BEEN MOVING AROUND A LOT MORE THAN USUAL: 3
2. FEELING DOWN, DEPRESSED OR HOPELESS: 3
3. TROUBLE FALLING OR STAYING ASLEEP: 3
SUM OF ALL RESPONSES TO PHQ9 QUESTIONS 1 & 2: 6
SUM OF ALL RESPONSES TO PHQ QUESTIONS 1-9: 27
6. FEELING BAD ABOUT YOURSELF - OR THAT YOU ARE A FAILURE OR HAVE LET YOURSELF OR YOUR FAMILY DOWN: 3
7. TROUBLE CONCENTRATING ON THINGS, SUCH AS READING THE NEWSPAPER OR WATCHING TELEVISION: 3
SUM OF ALL RESPONSES TO PHQ QUESTIONS 1-9: 27
1. LITTLE INTEREST OR PLEASURE IN DOING THINGS: 3
5. POOR APPETITE OR OVEREATING: 3

## 2024-01-18 ASSESSMENT — COLUMBIA-SUICIDE SEVERITY RATING SCALE - C-SSRS
5. HAVE YOU STARTED TO WORK OUT OR WORKED OUT THE DETAILS OF HOW TO KILL YOURSELF? DO YOU INTEND TO CARRY OUT THIS PLAN?: NO
3. HAVE YOU BEEN THINKING ABOUT HOW YOU MIGHT KILL YOURSELF?: YES
2. HAVE YOU ACTUALLY HAD ANY THOUGHTS OF KILLING YOURSELF?: YES
4. HAVE YOU HAD THESE THOUGHTS AND HAD SOME INTENTION OF ACTING ON THEM?: NO
1. WITHIN THE PAST MONTH, HAVE YOU WISHED YOU WERE DEAD OR WISHED YOU COULD GO TO SLEEP AND NOT WAKE UP?: YES
6. HAVE YOU EVER DONE ANYTHING, STARTED TO DO ANYTHING, OR PREPARED TO DO ANYTHING TO END YOUR LIFE?: NO

## 2024-01-18 NOTE — PROGRESS NOTES
2024    Blood pressure (!) 90/58, pulse 86, temperature 98.3 °F (36.8 °C), temperature source Oral, resp. rate 18, weight 56.2 kg (124 lb), SpO2 98 %, not currently breastfeeding.    Jennifer Norris (:  1960) is a 63 y.o. female, here for evaluation of the following medical concerns:    Chief Complaint   Patient presents with    Follow-up     Patient would like to discuss getting on medication for depression.     Feeling really depressed the past week.  Just sold family home last month.  Father  10/22.   Mother  8 hrs ago.  Gets along with sibs OK.   Is sleeping OK  No suicide plan. Just being dead.  Feels she has been depressed her whole life.  But it would be intermittent.  This is persistent now.  More severe.      Just no desire to do anything.  Has not been going to work due to low motivation.  Just lies in bed and does nothing.   Watches TV all day. Has to force herself to shower.  Is tired of 'pretending everything is alright' to extended family.  Has never talked to a therapist.    Hx of MDD, anxiety and grief reaction.  Did not like being on Abilify, which was used before.  psych meds used previously: lexparo, atarax, abilify, seroquel, pristiq, buspar, pamelor, savella, cymbalta, Celexa, Wellbutrin    No alcohol use  Smokes some  No drug use        Patient Active Problem List   Diagnosis    Hot flashes    Vitamin D deficiency- 14 on 3/14/14    Fibromyalgia    Insomnia    Major depression (HCC)    Ulnar neuropathy at elbow    Lateral epicondylitis of both elbows    Hyperglycemia-  (3/18)    Chronic anxiety    DDD (degenerative disc disease), cervical    Impingement syndrome of left shoulder    History of tobacco use (started in teens, quit 60. 3/4 ppd mostly    Generalized hypermobility of joints        Body mass index is 20.63 kg/m².    Wt Readings from Last 3 Encounters:   24 56.2 kg (124 lb)   08/10/23 56.2 kg (124 lb)   23 56.7 kg (125 lb)       BP Readings

## 2024-01-19 RX ORDER — FLUOXETINE 10 MG/1
10 CAPSULE ORAL DAILY
Qty: 14 CAPSULE | Refills: 0 | Status: SHIPPED | OUTPATIENT
Start: 2024-01-19

## 2024-01-19 RX ORDER — FLUOXETINE HYDROCHLORIDE 20 MG/1
20 CAPSULE ORAL DAILY
Qty: 90 CAPSULE | Refills: 1 | Status: SHIPPED | OUTPATIENT
Start: 2024-02-02

## 2024-01-19 NOTE — TELEPHONE ENCOUNTER
Please notify Jennifer Norris that I ordered 14 caps of the 10 mg dose and 20 mg separately (to be started after taking the 10 mg for 2 wks)  Thanks.

## 2024-02-02 ENCOUNTER — PATIENT MESSAGE (OUTPATIENT)
Dept: FAMILY MEDICINE CLINIC | Age: 64
End: 2024-02-02

## 2024-02-02 RX ORDER — VILAZODONE HYDROCHLORIDE 10 MG/1
10 TABLET ORAL DAILY
Qty: 30 TABLET | Refills: 1 | Status: SHIPPED | OUTPATIENT
Start: 2024-02-02

## 2024-02-02 NOTE — TELEPHONE ENCOUNTER
Patient still not doing well with her medication.  Prozac not helping and she has not returned to work  Patient would like FMLA forms filled out.  Patient scheduled next week to go over meds.

## 2024-02-02 NOTE — TELEPHONE ENCOUNTER
Called the patient and gave  advise.  Patient stated that her rx was ready according to her pharmacy.  Advised the patient to read her message back from  in [facility].  Patient stated she will and she will let office know if she needs a prior auth on her medication.  No further questions at this time.

## 2024-02-06 ENCOUNTER — OFFICE VISIT (OUTPATIENT)
Dept: FAMILY MEDICINE CLINIC | Age: 64
End: 2024-02-06
Payer: COMMERCIAL

## 2024-02-06 VITALS
WEIGHT: 120.6 LBS | BODY MASS INDEX: 20.07 KG/M2 | TEMPERATURE: 98.7 F | SYSTOLIC BLOOD PRESSURE: 128 MMHG | HEART RATE: 86 BPM | RESPIRATION RATE: 18 BRPM | DIASTOLIC BLOOD PRESSURE: 70 MMHG | OXYGEN SATURATION: 98 %

## 2024-02-06 DIAGNOSIS — F33.1 MODERATE EPISODE OF RECURRENT MAJOR DEPRESSIVE DISORDER (HCC): Primary | ICD-10-CM

## 2024-02-06 PROCEDURE — 99214 OFFICE O/P EST MOD 30 MIN: CPT | Performed by: FAMILY MEDICINE

## 2024-02-06 NOTE — PROGRESS NOTES
2024    Blood pressure 128/70, pulse 86, temperature 98.7 °F (37.1 °C), temperature source Oral, resp. rate 18, weight 54.7 kg (120 lb 9.6 oz), SpO2 98 %, not currently breastfeeding.    Jennifer Norris (:  1960) is a 63 y.o. female, here for evaluation of the following medical concerns:    Chief Complaint   Patient presents with    Other     Prozac side effects did not help. Patient on VIIBRD  Patient needs FMLA forms filled out for depression, anxiety.     F/u MDD with anxiety.  Did not tolerate fluox due to n/v/d x 2 wks without lessening. Lost 4 lbs.    Stopped and we started Viibryd a few days ago currently taking 1/2 tab.    Not on Abilify.  Is sleeping OK  Not working. Hoping to return next week.    No thoughts of self harm- but 'it would be easier to be dead'  she has not intention of harming self however.    Still low energy/motivation.  Feels worse in morning, better in afternoon.    Has not talked to therapist.    This game on gradually after father .    Pain levels (FM) are tolerable now.  Less than a few years ago.  Gabapentin does help this.  Previously treated with opioids by pain mgmt. She does not recall effect on mood during those years.  'I guess I felt better'    Did start smoking again.    Patient Active Problem List   Diagnosis    Hot flashes    Vitamin D deficiency- 14 on 3/14/14    Fibromyalgia    Insomnia    Major depression (HCC)    Ulnar neuropathy at elbow    Lateral epicondylitis of both elbows    Hyperglycemia-  (3/18)    Chronic anxiety    DDD (degenerative disc disease), cervical    Impingement syndrome of left shoulder    History of tobacco use (started in teens, quit 60. 3/4 ppd mostly    Generalized hypermobility of joints        Body mass index is 20.07 kg/m².    Wt Readings from Last 3 Encounters:   24 54.7 kg (120 lb 9.6 oz)   24 56.2 kg (124 lb)   08/10/23 56.2 kg (124 lb)       BP Readings from Last 3 Encounters:   24 128/70

## 2024-02-06 NOTE — PATIENT INSTRUCTIONS
Viibryd. Use 5 mg dose for 1-2 wks, then 10 mg daily for at least 4 wks, then 20 mg daily for at least 4 wks, then you have option of increasing further to 30 (three 10 mg tabs or 1.5 of the 20 mg tabs).  Later can increase to 40 mg.    After we understand fully how you feel on the highest dose of Viibryd, we can consider other additional treatments, based on symptoms.    Low dose naltrexone (thought to increase endogenous endorphin production)    Strattera: medicine for ADHD that can increase dopamine and norepinephrine in a way that can have a beneficial effect on energy and mood

## 2024-02-26 ENCOUNTER — PATIENT MESSAGE (OUTPATIENT)
Dept: FAMILY MEDICINE CLINIC | Age: 64
End: 2024-02-26

## 2024-02-26 RX ORDER — VILAZODONE HYDROCHLORIDE 20 MG/1
20 TABLET ORAL DAILY
Qty: 30 TABLET | Refills: 3 | Status: SHIPPED | OUTPATIENT
Start: 2024-02-26

## 2024-02-26 NOTE — TELEPHONE ENCOUNTER
From: Jennifer Norris  To: Dr. Nacho Buckley  Sent: 2/26/2024 3:49 PM EST  Subject: Med    Can we go ahead and up my vibryd dose to 20mg? I feel somewhat better and have no side effects. Thank you Jennifer

## 2024-03-27 DIAGNOSIS — M79.7 FIBROMYALGIA: ICD-10-CM

## 2024-03-27 RX ORDER — GABAPENTIN 800 MG/1
TABLET ORAL
Qty: 90 TABLET | Refills: 2 | Status: SHIPPED | OUTPATIENT
Start: 2024-03-27 | End: 2024-06-25

## 2024-03-29 DIAGNOSIS — M79.7 FIBROMYALGIA: ICD-10-CM

## 2024-03-29 RX ORDER — GABAPENTIN 800 MG/1
TABLET ORAL
Qty: 90 TABLET | Refills: 0 | OUTPATIENT
Start: 2024-03-29

## 2024-04-02 ENCOUNTER — OFFICE VISIT (OUTPATIENT)
Dept: FAMILY MEDICINE CLINIC | Age: 64
End: 2024-04-02
Payer: COMMERCIAL

## 2024-04-02 VITALS
BODY MASS INDEX: 20.17 KG/M2 | RESPIRATION RATE: 18 BRPM | HEART RATE: 83 BPM | SYSTOLIC BLOOD PRESSURE: 88 MMHG | TEMPERATURE: 97.5 F | OXYGEN SATURATION: 96 % | DIASTOLIC BLOOD PRESSURE: 50 MMHG | WEIGHT: 121.2 LBS

## 2024-04-02 DIAGNOSIS — F33.0 MILD EPISODE OF RECURRENT MAJOR DEPRESSIVE DISORDER (HCC): Primary | ICD-10-CM

## 2024-04-02 PROCEDURE — 99214 OFFICE O/P EST MOD 30 MIN: CPT | Performed by: FAMILY MEDICINE

## 2024-04-02 RX ORDER — VILAZODONE HYDROCHLORIDE 40 MG/1
40 TABLET ORAL DAILY
Qty: 30 TABLET | Refills: 3 | Status: SHIPPED | OUTPATIENT
Start: 2024-04-02

## 2024-04-02 SDOH — ECONOMIC STABILITY: FOOD INSECURITY: WITHIN THE PAST 12 MONTHS, THE FOOD YOU BOUGHT JUST DIDN'T LAST AND YOU DIDN'T HAVE MONEY TO GET MORE.: NEVER TRUE

## 2024-04-02 SDOH — ECONOMIC STABILITY: INCOME INSECURITY: HOW HARD IS IT FOR YOU TO PAY FOR THE VERY BASICS LIKE FOOD, HOUSING, MEDICAL CARE, AND HEATING?: NOT HARD AT ALL

## 2024-04-02 SDOH — ECONOMIC STABILITY: FOOD INSECURITY: WITHIN THE PAST 12 MONTHS, YOU WORRIED THAT YOUR FOOD WOULD RUN OUT BEFORE YOU GOT MONEY TO BUY MORE.: NEVER TRUE

## 2024-04-02 NOTE — PROGRESS NOTES
2024    Blood pressure (!) 88/50, pulse 83, temperature 97.5 °F (36.4 °C), temperature source Oral, resp. rate 18, weight 55 kg (121 lb 3.2 oz), SpO2 96 %, not currently breastfeeding.    Jennifer Norris (:  1960) is a 63 y.o. female, here for evaluation of the following medical concerns:    Chief Complaint   Patient presents with    Follow-up     Would like her Viibryd  upped on dosage.  Feels good.     Here for routine follow up of MDD, anxiety.  Feeling some what better   Started viibryd 10 mg in early Feb.  Has been on 20 mg for past months: no SE's  Is substantially better.      Improved irritability, more positive, more patient.  Worrying less  Sleeping better  Better energy  No thoughts of self harm or wanting to be dead.  Is more active (wants to do more things anyway). Like going to see people.    Still feels like she has room for improvement in mood and wants to try higher dose.    Not seeing therapist.    Gabapentin still helping pain from fibro.  Still not smoking  No new concerns.    Patient Active Problem List   Diagnosis    Hot flashes    Vitamin D deficiency- 14 on 3/14/14    Fibromyalgia    Insomnia    Major depression (HCC)    Ulnar neuropathy at elbow    Lateral epicondylitis of both elbows    Hyperglycemia-  (3/18)    Chronic anxiety    DDD (degenerative disc disease), cervical    Impingement syndrome of left shoulder    History of tobacco use (started in teens, quit 60. 34 ppd mostly    Generalized hypermobility of joints        Body mass index is 20.17 kg/m².    Wt Readings from Last 3 Encounters:   24 55 kg (121 lb 3.2 oz)   24 54.7 kg (120 lb 9.6 oz)   24 56.2 kg (124 lb)       BP Readings from Last 3 Encounters:   24 (!) 88/50   24 128/70   24 (!) 90/58       No Known Allergies    Prior to Visit Medications    Medication Sig Taking? Authorizing Provider   gabapentin (NEURONTIN) 800 MG tablet TAKE 1 TABLET BY MOUTH 3 TIMES A DAY Yes

## 2024-07-01 DIAGNOSIS — M79.7 FIBROMYALGIA: ICD-10-CM

## 2024-07-01 RX ORDER — GABAPENTIN 800 MG/1
TABLET ORAL
Qty: 90 TABLET | Refills: 2 | Status: SHIPPED | OUTPATIENT
Start: 2024-07-01 | End: 2024-07-02 | Stop reason: SDUPTHER

## 2024-07-02 ENCOUNTER — OFFICE VISIT (OUTPATIENT)
Dept: FAMILY MEDICINE CLINIC | Age: 64
End: 2024-07-02
Payer: COMMERCIAL

## 2024-07-02 VITALS
DIASTOLIC BLOOD PRESSURE: 60 MMHG | RESPIRATION RATE: 18 BRPM | SYSTOLIC BLOOD PRESSURE: 92 MMHG | WEIGHT: 120.6 LBS | TEMPERATURE: 98.1 F | HEART RATE: 82 BPM | HEIGHT: 65 IN | OXYGEN SATURATION: 96 % | BODY MASS INDEX: 20.09 KG/M2

## 2024-07-02 DIAGNOSIS — R73.9 HYPERGLYCEMIA: ICD-10-CM

## 2024-07-02 DIAGNOSIS — Z13.220 SCREENING, LIPID: ICD-10-CM

## 2024-07-02 DIAGNOSIS — M79.7 FIBROMYALGIA: ICD-10-CM

## 2024-07-02 DIAGNOSIS — Z12.31 SCREENING MAMMOGRAM FOR BREAST CANCER: ICD-10-CM

## 2024-07-02 DIAGNOSIS — Z00.00 WELL ADULT EXAM: Primary | ICD-10-CM

## 2024-07-02 DIAGNOSIS — Z12.11 SCREEN FOR COLON CANCER: ICD-10-CM

## 2024-07-02 DIAGNOSIS — F33.41 RECURRENT MAJOR DEPRESSIVE DISORDER, IN PARTIAL REMISSION (HCC): ICD-10-CM

## 2024-07-02 PROCEDURE — 99396 PREV VISIT EST AGE 40-64: CPT | Performed by: FAMILY MEDICINE

## 2024-07-02 RX ORDER — GABAPENTIN 800 MG/1
TABLET ORAL
Qty: 90 TABLET | Refills: 2 | Status: SHIPPED | OUTPATIENT
Start: 2024-07-02 | End: 2024-09-30

## 2024-07-02 RX ORDER — VILAZODONE HYDROCHLORIDE 40 MG/1
40 TABLET ORAL DAILY
Qty: 30 TABLET | Refills: 3 | Status: SHIPPED | OUTPATIENT
Start: 2024-07-02

## 2024-08-01 RX ORDER — VILAZODONE HYDROCHLORIDE 40 MG/1
40 TABLET ORAL DAILY
Qty: 30 TABLET | Refills: 3 | Status: SHIPPED | OUTPATIENT
Start: 2024-08-01

## 2024-10-06 DIAGNOSIS — M79.7 FIBROMYALGIA: ICD-10-CM

## 2024-10-07 RX ORDER — GABAPENTIN 800 MG/1
TABLET ORAL
Qty: 90 TABLET | Refills: 2 | Status: SHIPPED | OUTPATIENT
Start: 2024-10-07 | End: 2025-01-05

## 2024-10-25 DIAGNOSIS — Z13.220 SCREENING, LIPID: ICD-10-CM

## 2024-10-25 DIAGNOSIS — R73.9 HYPERGLYCEMIA: ICD-10-CM

## 2024-10-25 LAB
ALBUMIN SERPL-MCNC: 4.3 G/DL (ref 3.4–5)
ALBUMIN/GLOB SERPL: 2.3 {RATIO} (ref 1.1–2.2)
ALP SERPL-CCNC: 46 U/L (ref 40–129)
ALT SERPL-CCNC: 19 U/L (ref 10–40)
ANION GAP SERPL CALCULATED.3IONS-SCNC: 9 MMOL/L (ref 3–16)
AST SERPL-CCNC: 19 U/L (ref 15–37)
BILIRUB SERPL-MCNC: <0.2 MG/DL (ref 0–1)
BUN SERPL-MCNC: 22 MG/DL (ref 7–20)
CALCIUM SERPL-MCNC: 9.5 MG/DL (ref 8.3–10.6)
CHLORIDE SERPL-SCNC: 106 MMOL/L (ref 99–110)
CHOLEST SERPL-MCNC: 252 MG/DL (ref 0–199)
CO2 SERPL-SCNC: 28 MMOL/L (ref 21–32)
CREAT SERPL-MCNC: 0.9 MG/DL (ref 0.6–1.2)
EST. AVERAGE GLUCOSE BLD GHB EST-MCNC: 128.4 MG/DL
GFR SERPLBLD CREATININE-BSD FMLA CKD-EPI: 72 ML/MIN/{1.73_M2}
GLUCOSE SERPL-MCNC: 93 MG/DL (ref 70–99)
HBA1C MFR BLD: 6.1 %
HDLC SERPL-MCNC: 76 MG/DL (ref 40–60)
LDLC SERPL CALC-MCNC: 162 MG/DL
POTASSIUM SERPL-SCNC: 4.4 MMOL/L (ref 3.5–5.1)
PROT SERPL-MCNC: 6.2 G/DL (ref 6.4–8.2)
SODIUM SERPL-SCNC: 143 MMOL/L (ref 136–145)
TRIGL SERPL-MCNC: 68 MG/DL (ref 0–150)
VLDLC SERPL CALC-MCNC: 14 MG/DL

## 2024-12-09 RX ORDER — VILAZODONE HYDROCHLORIDE 40 MG/1
40 TABLET ORAL DAILY
Qty: 30 TABLET | Refills: 5 | Status: SHIPPED | OUTPATIENT
Start: 2024-12-09

## 2025-01-08 DIAGNOSIS — M79.7 FIBROMYALGIA: ICD-10-CM

## 2025-01-08 RX ORDER — GABAPENTIN 800 MG/1
TABLET ORAL
Qty: 90 TABLET | Refills: 2 | Status: SHIPPED | OUTPATIENT
Start: 2025-01-08 | End: 2025-04-08

## 2025-04-09 DIAGNOSIS — M79.7 FIBROMYALGIA: ICD-10-CM

## 2025-04-09 RX ORDER — GABAPENTIN 800 MG/1
800 TABLET ORAL 3 TIMES DAILY
Qty: 90 TABLET | Refills: 2 | Status: SHIPPED | OUTPATIENT
Start: 2025-04-09 | End: 2025-07-08

## 2025-06-06 NOTE — PROGRESS NOTES
This message is to inform you that the patient has not yet read the following message. (Notification date: June 5, 2025)    Hyperglycemia-  (3/18)    Chronic anxiety    DDD (degenerative disc disease), cervical    Impingement syndrome of left shoulder    History of tobacco use (started in teens, quit at 60. 3/4 ppd mostly    Generalized hypermobility of joints        Body mass index is 20.38 kg/m².    Wt Readings from Last 3 Encounters:   07/02/24 54.7 kg (120 lb 9.6 oz)   04/02/24 55 kg (121 lb 3.2 oz)   02/06/24 54.7 kg (120 lb 9.6 oz)       BP Readings from Last 3 Encounters:   07/02/24 92/60   04/02/24 (!) 88/50   02/06/24 128/70       No Known Allergies    Prior to Visit Medications    Medication Sig Taking? Authorizing Provider   gabapentin (NEURONTIN) 800 MG tablet TAKE 1 TABLET BY MOUTH THREE TIMES DAILY Yes Nacho Buckley MD   vilazodone HCl (VIIBRYD) 40 MG TABS Take 1 tablet by mouth daily Yes Nacho Buckley MD        Social History     Tobacco Use    Smoking status: Every Day     Current packs/day: 0.10     Average packs/day: 0.1 packs/day for 30.0 years (3.0 ttl pk-yrs)     Types: Cigarettes    Smokeless tobacco: Never    Tobacco comments:     uses vape now, no cigarettes   Substance Use Topics    Alcohol use: Yes     Alcohol/week: 0.0 standard drinks of alcohol     Comment: rare use    Drug use: No       Review of Systems   All other systems reviewed and are negative.      Physical Exam  Vitals and nursing note reviewed.   Constitutional:       General: She is not in acute distress.     Appearance: Normal appearance. She is well-developed. She is not diaphoretic.   HENT:      Head: Normocephalic and atraumatic.      Right Ear: External ear normal.      Left Ear: External ear normal.      Nose: Nose normal.      Mouth/Throat:      Pharynx: No oropharyngeal exudate.   Eyes:      General:         Right eye: No discharge.         Left eye: No discharge.      Conjunctiva/sclera: Conjunctivae normal.      Pupils: Pupils are equal, round, and reactive to light.   Neck:      Thyroid: No thyromegaly.

## 2025-06-11 RX ORDER — VILAZODONE HYDROCHLORIDE 40 MG/1
40 TABLET ORAL DAILY
Qty: 30 TABLET | Refills: 5 | Status: SHIPPED | OUTPATIENT
Start: 2025-06-11

## 2025-07-07 ENCOUNTER — OFFICE VISIT (OUTPATIENT)
Dept: FAMILY MEDICINE CLINIC | Age: 65
End: 2025-07-07
Payer: COMMERCIAL

## 2025-07-07 VITALS
HEART RATE: 88 BPM | RESPIRATION RATE: 14 BRPM | DIASTOLIC BLOOD PRESSURE: 68 MMHG | BODY MASS INDEX: 19.83 KG/M2 | WEIGHT: 119 LBS | OXYGEN SATURATION: 96 % | HEIGHT: 65 IN | SYSTOLIC BLOOD PRESSURE: 110 MMHG

## 2025-07-07 DIAGNOSIS — Z13.220 SCREENING, LIPID: ICD-10-CM

## 2025-07-07 DIAGNOSIS — Z72.0 TOBACCO USE: ICD-10-CM

## 2025-07-07 DIAGNOSIS — F33.42 RECURRENT MAJOR DEPRESSIVE DISORDER, IN FULL REMISSION: ICD-10-CM

## 2025-07-07 DIAGNOSIS — Z00.00 WELL ADULT EXAM: Primary | ICD-10-CM

## 2025-07-07 DIAGNOSIS — Z12.11 SCREEN FOR COLON CANCER: ICD-10-CM

## 2025-07-07 DIAGNOSIS — R73.9 HYPERGLYCEMIA: ICD-10-CM

## 2025-07-07 DIAGNOSIS — M79.7 FIBROMYALGIA: ICD-10-CM

## 2025-07-07 DIAGNOSIS — F41.9 CHRONIC ANXIETY: ICD-10-CM

## 2025-07-07 PROCEDURE — 99396 PREV VISIT EST AGE 40-64: CPT | Performed by: FAMILY MEDICINE

## 2025-07-07 RX ORDER — VILAZODONE HYDROCHLORIDE 40 MG/1
40 TABLET ORAL DAILY
Qty: 90 TABLET | Refills: 1 | Status: SHIPPED | OUTPATIENT
Start: 2025-07-07

## 2025-07-07 RX ORDER — GABAPENTIN 800 MG/1
800 TABLET ORAL 3 TIMES DAILY
Qty: 90 TABLET | Refills: 2 | Status: SHIPPED | OUTPATIENT
Start: 2025-07-07 | End: 2025-10-05

## 2025-07-07 SDOH — ECONOMIC STABILITY: FOOD INSECURITY: WITHIN THE PAST 12 MONTHS, THE FOOD YOU BOUGHT JUST DIDN'T LAST AND YOU DIDN'T HAVE MONEY TO GET MORE.: NEVER TRUE

## 2025-07-07 SDOH — ECONOMIC STABILITY: FOOD INSECURITY: WITHIN THE PAST 12 MONTHS, YOU WORRIED THAT YOUR FOOD WOULD RUN OUT BEFORE YOU GOT MONEY TO BUY MORE.: NEVER TRUE

## 2025-07-07 ASSESSMENT — PATIENT HEALTH QUESTIONNAIRE - PHQ9
3. TROUBLE FALLING OR STAYING ASLEEP: NOT AT ALL
SUM OF ALL RESPONSES TO PHQ QUESTIONS 1-9: 0
4. FEELING TIRED OR HAVING LITTLE ENERGY: NOT AT ALL
10. IF YOU CHECKED OFF ANY PROBLEMS, HOW DIFFICULT HAVE THESE PROBLEMS MADE IT FOR YOU TO DO YOUR WORK, TAKE CARE OF THINGS AT HOME, OR GET ALONG WITH OTHER PEOPLE: NOT DIFFICULT AT ALL
5. POOR APPETITE OR OVEREATING: NOT AT ALL
6. FEELING BAD ABOUT YOURSELF - OR THAT YOU ARE A FAILURE OR HAVE LET YOURSELF OR YOUR FAMILY DOWN: NOT AT ALL
SUM OF ALL RESPONSES TO PHQ QUESTIONS 1-9: 0
8. MOVING OR SPEAKING SO SLOWLY THAT OTHER PEOPLE COULD HAVE NOTICED. OR THE OPPOSITE, BEING SO FIGETY OR RESTLESS THAT YOU HAVE BEEN MOVING AROUND A LOT MORE THAN USUAL: NOT AT ALL
2. FEELING DOWN, DEPRESSED OR HOPELESS: NOT AT ALL
7. TROUBLE CONCENTRATING ON THINGS, SUCH AS READING THE NEWSPAPER OR WATCHING TELEVISION: NOT AT ALL
SUM OF ALL RESPONSES TO PHQ QUESTIONS 1-9: 0
9. THOUGHTS THAT YOU WOULD BE BETTER OFF DEAD, OR OF HURTING YOURSELF: NOT AT ALL
SUM OF ALL RESPONSES TO PHQ QUESTIONS 1-9: 0
1. LITTLE INTEREST OR PLEASURE IN DOING THINGS: NOT AT ALL

## 2025-07-07 NOTE — ASSESSMENT & PLAN NOTE
- Stable; continue gabapentin for this. Encouraged to stay active.      Orders:    gabapentin (NEURONTIN) 800 MG tablet; Take 1 tablet by mouth 3 times daily for 90 days.

## 2025-07-07 NOTE — PROGRESS NOTES
2025    Blood pressure 110/68, pulse 88, resp. rate 14, height 1.638 m (5' 4.5\"), weight 54 kg (119 lb), SpO2 96%, not currently breastfeeding.    Jennifer Norris (:  1960) is a 64 y.o. female, here for evaluation of the following medical concerns:    Chief Complaint   Patient presents with    Annual Exam     Needs meds refilled     Here for annual wellness check up    She declines breast cancer screening.  Does not run in her family.  Has deferred this since her last mammo years ago.  Has breast implants.  She understands it is for finding early cancer when it is still treatable.    Declines CRC screening also- does not want to miss work.  She has a cologuard from before that she did not submit.  She says she will complete it if ordered again.    She uses tobacco. 1/2 ppd.  She quit for periods of time. (Last was 2024, x 3 mo)  Restarted due to 'stress'  Has not tried Chantix.  Not interested in taking it.    She is not interested in lung cancer screening.  Started age 22. Smokes about 1/2 ppd for most her life.    Serena does not want immunizations    Uses gabapentin for FM.  Feels this is helpful. Feels she is 'used' do her pain and adapted to it.    Is active in her work and able to perform that.    Uses viibryd for chronic anxiety.  He has bene very helpful and she wishes to continue.  Sleeping well.  Denies depression/sadness.    Dental: sees dentist irregularly- needs to schedule.  Her  has been ill and this has interfered with this.    Last renal function test:   Lab Results   Component Value Date/Time     10/25/2024 06:52 AM    K 4.4 10/25/2024 06:52 AM     10/25/2024 06:52 AM    CO2 28 10/25/2024 06:52 AM    BUN 22 10/25/2024 06:52 AM    CREATININE 0.9 10/25/2024 06:52 AM    GLUCOSE 93 10/25/2024 06:52 AM    CALCIUM 9.5 10/25/2024 06:52 AM    LABGLOM 72 10/25/2024 06:52 AM        Last lipid test:  Lab Results   Component Value Date    CHOL 252 (H) 10/25/2024